# Patient Record
Sex: MALE | Race: WHITE | NOT HISPANIC OR LATINO | Employment: OTHER | URBAN - METROPOLITAN AREA
[De-identification: names, ages, dates, MRNs, and addresses within clinical notes are randomized per-mention and may not be internally consistent; named-entity substitution may affect disease eponyms.]

---

## 2023-05-05 DIAGNOSIS — R73.01 IMPAIRED FASTING GLUCOSE: ICD-10-CM

## 2023-05-05 DIAGNOSIS — I10 HYPERTENSION, UNSPECIFIED TYPE: ICD-10-CM

## 2023-05-05 DIAGNOSIS — R79.89 ELEVATED BRAIN NATRIURETIC PEPTIDE (BNP) LEVEL: ICD-10-CM

## 2023-05-05 DIAGNOSIS — E78.5 HYPERLIPIDEMIA, UNSPECIFIED HYPERLIPIDEMIA TYPE: ICD-10-CM

## 2023-05-05 PROBLEM — I48.91 AFIB (MULTI): Status: ACTIVE | Noted: 2023-05-05

## 2023-05-05 PROBLEM — R91.1 LUNG NODULE: Status: ACTIVE | Noted: 2023-05-05

## 2023-05-05 PROBLEM — R06.3 CHEYNE-STOKES RESPIRATION: Status: ACTIVE | Noted: 2023-05-05

## 2023-05-05 PROBLEM — R80.9 PROTEINURIA, UNSPECIFIED: Status: ACTIVE | Noted: 2023-05-05

## 2023-05-05 PROBLEM — R26.0 ATAXIC GAIT: Status: ACTIVE | Noted: 2023-05-05

## 2023-05-05 PROBLEM — R74.8 ELEVATED SERUM GAMMA-GLUTAMYL TRANSFERASE LEVEL: Status: ACTIVE | Noted: 2023-05-05

## 2023-05-05 PROBLEM — G47.39 TREATMENT-EMERGENT CENTRAL SLEEP APNEA: Status: ACTIVE | Noted: 2023-05-05

## 2023-05-05 PROBLEM — R35.1 NOCTURIA: Status: ACTIVE | Noted: 2023-05-05

## 2023-05-05 PROBLEM — J30.2 SEASONAL ALLERGIES: Status: ACTIVE | Noted: 2023-05-05

## 2023-05-05 PROBLEM — I71.20 THORACIC AORTIC ANEURYSM (CMS-HCC): Status: ACTIVE | Noted: 2023-05-05

## 2023-05-05 PROBLEM — H61.20 CERUMEN IMPACTION: Status: ACTIVE | Noted: 2023-05-05

## 2023-05-05 PROBLEM — G47.33 OBSTRUCTIVE SLEEP APNEA: Status: ACTIVE | Noted: 2023-05-05

## 2023-05-05 PROBLEM — E66.9 OBESITY (BMI 30-39.9): Status: ACTIVE | Noted: 2023-05-05

## 2023-05-05 NOTE — PROGRESS NOTES
Orders Placed This Encounter   Procedures    Comprehensive metabolic panel     Standing Status:   Future     Standing Expiration Date:   5/5/2024     Order Specific Question:   Release result to MyChart     Answer:   Immediate    CBC and Auto Differential     Standing Status:   Future     Standing Expiration Date:   5/5/2024     Order Specific Question:   Release result to MyChart     Answer:   Immediate    Lipid panel     Standing Status:   Future     Standing Expiration Date:   5/5/2024     Order Specific Question:   Release result to MyChart     Answer:   Immediate    CK     Standing Status:   Future     Standing Expiration Date:   5/5/2024     Order Specific Question:   Release result to MyChart     Answer:   Immediate    Hemoglobin A1c     Standing Status:   Future     Standing Expiration Date:   5/5/2024     Order Specific Question:   Release result to MyChart     Answer:   Immediate    Tsh With Reflex To Free T4 If Abnormal     Standing Status:   Future     Standing Expiration Date:   5/5/2024     Order Specific Question:   Release result to MyChart     Answer:   Immediate

## 2023-05-15 DIAGNOSIS — R79.89 OTHER SPECIFIED ABNORMAL FINDINGS OF BLOOD CHEMISTRY: ICD-10-CM

## 2023-05-15 DIAGNOSIS — I10 ESSENTIAL (PRIMARY) HYPERTENSION: ICD-10-CM

## 2023-05-15 RX ORDER — LOSARTAN POTASSIUM 100 MG/1
TABLET ORAL
Qty: 90 TABLET | Refills: 1 | Status: SHIPPED | OUTPATIENT
Start: 2023-05-15 | End: 2023-11-20

## 2023-05-15 RX ORDER — AMLODIPINE BESYLATE 5 MG/1
TABLET ORAL
Qty: 90 TABLET | Refills: 1 | Status: SHIPPED | OUTPATIENT
Start: 2023-05-15 | End: 2023-11-20

## 2023-05-26 ENCOUNTER — OFFICE VISIT (OUTPATIENT)
Dept: PRIMARY CARE | Facility: CLINIC | Age: 88
End: 2023-05-26
Payer: MEDICARE

## 2023-05-26 VITALS
HEART RATE: 86 BPM | WEIGHT: 254 LBS | DIASTOLIC BLOOD PRESSURE: 74 MMHG | HEIGHT: 72 IN | BODY MASS INDEX: 34.4 KG/M2 | SYSTOLIC BLOOD PRESSURE: 130 MMHG

## 2023-05-26 DIAGNOSIS — Z00.00 ROUTINE GENERAL MEDICAL EXAMINATION AT HEALTH CARE FACILITY: Primary | ICD-10-CM

## 2023-05-26 DIAGNOSIS — I48.11 LONGSTANDING PERSISTENT ATRIAL FIBRILLATION (MULTI): ICD-10-CM

## 2023-05-26 DIAGNOSIS — R35.1 NOCTURIA: ICD-10-CM

## 2023-05-26 DIAGNOSIS — I71.20 THORACIC AORTIC ANEURYSM WITHOUT RUPTURE, UNSPECIFIED PART (CMS-HCC): ICD-10-CM

## 2023-05-26 DIAGNOSIS — R73.01 IMPAIRED FASTING GLUCOSE: ICD-10-CM

## 2023-05-26 DIAGNOSIS — Z12.11 ENCOUNTER FOR SCREENING FOR MALIGNANT NEOPLASM OF COLON: ICD-10-CM

## 2023-05-26 DIAGNOSIS — E78.00 PURE HYPERCHOLESTEROLEMIA: ICD-10-CM

## 2023-05-26 DIAGNOSIS — I10 PRIMARY HYPERTENSION: ICD-10-CM

## 2023-05-26 PROCEDURE — 3078F DIAST BP <80 MM HG: CPT | Performed by: INTERNAL MEDICINE

## 2023-05-26 PROCEDURE — 1160F RVW MEDS BY RX/DR IN RCRD: CPT | Performed by: INTERNAL MEDICINE

## 2023-05-26 PROCEDURE — 1170F FXNL STATUS ASSESSED: CPT | Performed by: INTERNAL MEDICINE

## 2023-05-26 PROCEDURE — 1036F TOBACCO NON-USER: CPT | Performed by: INTERNAL MEDICINE

## 2023-05-26 PROCEDURE — G0439 PPPS, SUBSEQ VISIT: HCPCS | Performed by: INTERNAL MEDICINE

## 2023-05-26 PROCEDURE — 99214 OFFICE O/P EST MOD 30 MIN: CPT | Performed by: INTERNAL MEDICINE

## 2023-05-26 PROCEDURE — 1159F MED LIST DOCD IN RCRD: CPT | Performed by: INTERNAL MEDICINE

## 2023-05-26 PROCEDURE — 3075F SYST BP GE 130 - 139MM HG: CPT | Performed by: INTERNAL MEDICINE

## 2023-05-26 RX ORDER — ACETAMINOPHEN 500 MG
TABLET ORAL
COMMUNITY

## 2023-05-26 RX ORDER — ATORVASTATIN CALCIUM 10 MG/1
1 TABLET, FILM COATED ORAL NIGHTLY
COMMUNITY
Start: 2008-05-01

## 2023-05-26 RX ORDER — HYDROCHLOROTHIAZIDE 25 MG/1
25 TABLET ORAL DAILY
COMMUNITY
End: 2023-11-20

## 2023-05-26 RX ORDER — RIVAROXABAN 20 MG/1
1 TABLET, FILM COATED ORAL DAILY
COMMUNITY
Start: 2017-09-12

## 2023-05-26 RX ORDER — OMEGA-3S/DHA/EPA/FISH OIL 300-1000MG
CAPSULE ORAL
COMMUNITY
End: 2023-05-26 | Stop reason: ALTCHOICE

## 2023-05-26 ASSESSMENT — ACTIVITIES OF DAILY LIVING (ADL)
TAKING_MEDICATION: INDEPENDENT
DOING_HOUSEWORK: INDEPENDENT
BATHING: INDEPENDENT
GROCERY_SHOPPING: INDEPENDENT
MANAGING_FINANCES: INDEPENDENT
DRESSING: INDEPENDENT

## 2023-05-26 NOTE — PATIENT INSTRUCTIONS
Problem List Items Addressed This Visit          Circulatory    Afib (CMS/HCC)     Rate controlled, on xarelto, followed by Dr. Chowdhury         Hypertension     Well controlled on current medication, no change         Relevant Orders    CT cardiac scoring wo IV contrast    Thoracic aortic aneurysm (CMS/HCC)     Checking ct angio , last done in 2021, call [phone] to schedule         Relevant Orders    CT angio chest w and wo IV contrast       Endocrine/Metabolic    Impaired fasting glucose     Has been well controlled, limit sugars and carbs and get regular exercise         Relevant Orders    Hemoglobin A1C    CT cardiac scoring wo IV contrast       Other    Hyperlipidemia     Get fasting labs         Relevant Orders    CBC and Auto Differential    Comprehensive Metabolic Panel    Creatine Kinase    Lipid Panel    TSH with reflex to Free T4 if abnormal    CT cardiac scoring wo IV contrast    Nocturia    Relevant Orders    Prostate Specific Antigen     Other Visit Diagnoses       Routine general medical examination at health care facility    -  Primary    Encounter for screening for malignant neoplasm of colon        Relevant Orders    Fecal Occult Blood Immunoassay           Call [phone] to get ct angio for aneurysm follow up now and do ct cardiac score in 6months  Immunizations are up to date  Do stool test for colon screening  If labs good, recheck in 1 year, if cholesterol up OR sugar up will recheck in 6months

## 2023-05-26 NOTE — PROGRESS NOTES
Subjective   Reason for Visit: Rayo Padgett is an 87 y.o. male here for a Medicare Wellness visit.          Reviewed all medications by prescribing practitioner or clinical pharmacist (such as prescriptions, OTCs, herbal therapies and supplements) and documented in the medical record.    He has been very stressed about his wifes memory but feeling good since then  No sob  He walks the dog 3 to 5 holes [place] a golf [place],   No cp or pressure  Bowels are regular, no blood or black stools  Urine flow is good, nocturia 1-2/night, stream is strong    Home bp has been 136/78  No leg swelling.         Patient Care Team:  Debbie La DO as PCP - General  Debbie La DO as PCP - [facility] Attributed Provider     Review of Systems    Objective   Vitals:  /74   Pulse 86   Ht 1.829 m (6')   Wt 115 kg (254 lb)   BMI 34.45 kg/m²       Physical Exam  Constitutional:       Appearance: Normal appearance.   Neck:      Vascular: No carotid bruit.   Cardiovascular:      Rate and Rhythm: Normal rate. Rhythm irregular.      Heart sounds: No murmur heard.  Pulmonary:      Effort: Pulmonary effort is normal.      Breath sounds: Normal breath sounds.   Chest:   Breasts:     Left: Mass (linear mass in the left upper inner quadrant (implanted recorder)) present.   Musculoskeletal:      Right lower leg: No edema.      Left lower leg: No edema.      Comments: Decrease ext rotation of hips b/l   Lymphadenopathy:      Cervical: No cervical adenopathy.   Neurological:      Mental Status: He is oriented to person, place, and time.   Psychiatric:         Mood and Affect: Mood normal.         Assessment/Plan   Problem List Items Addressed This Visit    None    Patient Instructions     Problem List Items Addressed This Visit          Circulatory    Afib (CMS/HCC)     Rate controlled, on xarelto, followed by Dr. Chowdhury         Hypertension     Well controlled on current medication, no change         Relevant Orders    CT cardiac  scoring wo IV contrast    Thoracic aortic aneurysm (CMS/HCC)     Checking ct angio , last done in 2021, call [phone] to schedule         Relevant Orders    CT angio chest w and wo IV contrast       Endocrine/Metabolic    Impaired fasting glucose     Has been well controlled, limit sugars and carbs and get regular exercise         Relevant Orders    Hemoglobin A1C    CT cardiac scoring wo IV contrast       Other    Hyperlipidemia     Get fasting labs         Relevant Orders    CBC and Auto Differential    Comprehensive Metabolic Panel    Creatine Kinase    Lipid Panel    TSH with reflex to Free T4 if abnormal    CT cardiac scoring wo IV contrast    Nocturia    Relevant Orders    Prostate Specific Antigen     Other Visit Diagnoses       Routine general medical examination at health care facility    -  Primary    Encounter for screening for malignant neoplasm of colon        Relevant Orders    Fecal Occult Blood Immunoassay           Call [phone] to get ct angio for aneurysm follow up now and do ct cardiac score in 6months  Immunizations are up to date  Do stool test for colon screening  If labs good, recheck in 1 year, if cholesterol up OR sugar up will recheck in 6months

## 2023-05-26 NOTE — PROGRESS NOTES
Subjective   Patient ID: Rayo Padgett is a 87 y.o. male who presents for Medicare Annual Wellness Visit Subsequent.    HPI     Review of Systems    Objective   Ht 1.829 m (6')   Wt 115 kg (254 lb)   BMI 34.45 kg/m²     Physical Exam    Assessment/Plan

## 2023-06-14 ENCOUNTER — LAB (OUTPATIENT)
Dept: LAB | Facility: LAB | Age: 88
End: 2023-06-14
Payer: MEDICARE

## 2023-06-14 DIAGNOSIS — R35.1 NOCTURIA: ICD-10-CM

## 2023-06-14 DIAGNOSIS — R73.01 IMPAIRED FASTING GLUCOSE: ICD-10-CM

## 2023-06-14 DIAGNOSIS — E78.00 PURE HYPERCHOLESTEROLEMIA: ICD-10-CM

## 2023-06-14 LAB
ALANINE AMINOTRANSFERASE (SGPT) (U/L) IN SER/PLAS: 19 U/L (ref 10–52)
ALBUMIN (G/DL) IN SER/PLAS: 4.2 G/DL (ref 3.4–5)
ALKALINE PHOSPHATASE (U/L) IN SER/PLAS: 73 U/L (ref 33–136)
ANION GAP IN SER/PLAS: 15 MMOL/L (ref 10–20)
ASPARTATE AMINOTRANSFERASE (SGOT) (U/L) IN SER/PLAS: 16 U/L (ref 9–39)
BASOPHILS (10*3/UL) IN BLOOD BY AUTOMATED COUNT: 0.01 X10E9/L (ref 0–0.1)
BASOPHILS/100 LEUKOCYTES IN BLOOD BY AUTOMATED COUNT: 0.2 % (ref 0–2)
BILIRUBIN TOTAL (MG/DL) IN SER/PLAS: 1.2 MG/DL (ref 0–1.2)
CALCIUM (MG/DL) IN SER/PLAS: 9.1 MG/DL (ref 8.6–10.3)
CARBON DIOXIDE, TOTAL (MMOL/L) IN SER/PLAS: 29 MMOL/L (ref 21–32)
CHLORIDE (MMOL/L) IN SER/PLAS: 100 MMOL/L (ref 98–107)
CHOLESTEROL (MG/DL) IN SER/PLAS: 118 MG/DL (ref 0–199)
CHOLESTEROL IN HDL (MG/DL) IN SER/PLAS: 54.1 MG/DL
CHOLESTEROL/HDL RATIO: 2.2
CREATINE KINASE (U/L) IN SER/PLAS: 97 U/L (ref 0–325)
CREATININE (MG/DL) IN SER/PLAS: 0.99 MG/DL (ref 0.5–1.3)
EOSINOPHILS (10*3/UL) IN BLOOD BY AUTOMATED COUNT: 0.14 X10E9/L (ref 0–0.4)
EOSINOPHILS/100 LEUKOCYTES IN BLOOD BY AUTOMATED COUNT: 2.8 % (ref 0–6)
ERYTHROCYTE DISTRIBUTION WIDTH (RATIO) BY AUTOMATED COUNT: 13.6 % (ref 11.5–14.5)
ERYTHROCYTE MEAN CORPUSCULAR HEMOGLOBIN CONCENTRATION (G/DL) BY AUTOMATED: 32.8 G/DL (ref 32–36)
ERYTHROCYTE MEAN CORPUSCULAR VOLUME (FL) BY AUTOMATED COUNT: 94 FL (ref 80–100)
ERYTHROCYTES (10*6/UL) IN BLOOD BY AUTOMATED COUNT: 5.01 X10E12/L (ref 4.5–5.9)
GFR MALE: 74 ML/MIN/1.73M2
GLUCOSE (MG/DL) IN SER/PLAS: 93 MG/DL (ref 74–99)
HEMATOCRIT (%) IN BLOOD BY AUTOMATED COUNT: 47 % (ref 41–52)
HEMOGLOBIN (G/DL) IN BLOOD: 15.4 G/DL (ref 13.5–17.5)
IMMATURE GRANULOCYTES/100 LEUKOCYTES IN BLOOD BY AUTOMATED COUNT: 0.4 % (ref 0–0.9)
LDL: 51 MG/DL (ref 0–99)
LEUKOCYTES (10*3/UL) IN BLOOD BY AUTOMATED COUNT: 4.9 X10E9/L (ref 4.4–11.3)
LYMPHOCYTES (10*3/UL) IN BLOOD BY AUTOMATED COUNT: 1.05 X10E9/L (ref 0.8–3)
LYMPHOCYTES/100 LEUKOCYTES IN BLOOD BY AUTOMATED COUNT: 21.3 % (ref 13–44)
MONOCYTES (10*3/UL) IN BLOOD BY AUTOMATED COUNT: 0.6 X10E9/L (ref 0.05–0.8)
MONOCYTES/100 LEUKOCYTES IN BLOOD BY AUTOMATED COUNT: 12.2 % (ref 2–10)
NEUTROPHILS (10*3/UL) IN BLOOD BY AUTOMATED COUNT: 3.11 X10E9/L (ref 1.6–5.5)
NEUTROPHILS/100 LEUKOCYTES IN BLOOD BY AUTOMATED COUNT: 63.1 % (ref 40–80)
PLATELETS (10*3/UL) IN BLOOD AUTOMATED COUNT: 216 X10E9/L (ref 150–450)
POTASSIUM (MMOL/L) IN SER/PLAS: 4 MMOL/L (ref 3.5–5.3)
PROSTATE SPECIFIC AG (NG/ML) IN SER/PLAS: 0.63 NG/ML (ref 0–4)
PROTEIN TOTAL: 6.3 G/DL (ref 6.4–8.2)
SODIUM (MMOL/L) IN SER/PLAS: 140 MMOL/L (ref 136–145)
THYROTROPIN (MIU/L) IN SER/PLAS BY DETECTION LIMIT <= 0.05 MIU/L: 1.69 MIU/L (ref 0.44–3.98)
TRIGLYCERIDE (MG/DL) IN SER/PLAS: 65 MG/DL (ref 0–149)
UREA NITROGEN (MG/DL) IN SER/PLAS: 15 MG/DL (ref 6–23)
VLDL: 13 MG/DL (ref 0–40)

## 2023-06-14 PROCEDURE — 80061 LIPID PANEL: CPT

## 2023-06-14 PROCEDURE — 84153 ASSAY OF PSA TOTAL: CPT

## 2023-06-14 PROCEDURE — 84443 ASSAY THYROID STIM HORMONE: CPT

## 2023-06-14 PROCEDURE — 80053 COMPREHEN METABOLIC PANEL: CPT

## 2023-06-14 PROCEDURE — 83036 HEMOGLOBIN GLYCOSYLATED A1C: CPT

## 2023-06-14 PROCEDURE — 82550 ASSAY OF CK (CPK): CPT

## 2023-06-14 PROCEDURE — 85025 COMPLETE CBC W/AUTO DIFF WBC: CPT

## 2023-06-14 PROCEDURE — 36415 COLL VENOUS BLD VENIPUNCTURE: CPT

## 2023-06-15 LAB
ESTIMATED AVERAGE GLUCOSE FOR HBA1C: 120 MG/DL
HEMOGLOBIN A1C/HEMOGLOBIN TOTAL IN BLOOD: 5.8 %

## 2023-06-25 PROCEDURE — 82274 ASSAY TEST FOR BLOOD FECAL: CPT

## 2023-06-30 LAB — FECAL OCCULT BLD IMMUNOASSAY: NEGATIVE

## 2023-11-15 DIAGNOSIS — R73.01 IMPAIRED FASTING GLUCOSE: Primary | ICD-10-CM

## 2023-11-15 DIAGNOSIS — E78.00 PURE HYPERCHOLESTEROLEMIA: ICD-10-CM

## 2023-11-19 DIAGNOSIS — I10 ESSENTIAL (PRIMARY) HYPERTENSION: ICD-10-CM

## 2023-11-19 DIAGNOSIS — R79.89 OTHER SPECIFIED ABNORMAL FINDINGS OF BLOOD CHEMISTRY: ICD-10-CM

## 2023-11-20 RX ORDER — LOSARTAN POTASSIUM 100 MG/1
TABLET ORAL
Qty: 90 TABLET | Refills: 1 | Status: SHIPPED | OUTPATIENT
Start: 2023-11-20 | End: 2024-05-07

## 2023-11-20 RX ORDER — AMLODIPINE BESYLATE 5 MG/1
TABLET ORAL
Qty: 90 TABLET | Refills: 1 | Status: SHIPPED | OUTPATIENT
Start: 2023-11-20 | End: 2024-05-07

## 2023-11-20 RX ORDER — HYDROCHLOROTHIAZIDE 25 MG/1
25 TABLET ORAL DAILY
Qty: 90 TABLET | Refills: 1 | Status: SHIPPED | OUTPATIENT
Start: 2023-11-20 | End: 2024-05-07

## 2024-02-01 ENCOUNTER — HOSPITAL ENCOUNTER (OUTPATIENT)
Dept: RADIOLOGY | Facility: HOSPITAL | Age: 89
Discharge: HOME | End: 2024-02-01
Payer: MEDICARE

## 2024-02-01 DIAGNOSIS — E78.00 PURE HYPERCHOLESTEROLEMIA: ICD-10-CM

## 2024-02-01 DIAGNOSIS — R73.01 IMPAIRED FASTING GLUCOSE: ICD-10-CM

## 2024-02-01 PROCEDURE — 75571 CT HRT W/O DYE W/CA TEST: CPT

## 2024-04-10 ENCOUNTER — OFFICE VISIT (OUTPATIENT)
Dept: SLEEP MEDICINE | Facility: CLINIC | Age: 89
End: 2024-04-10
Payer: MEDICARE

## 2024-04-10 VITALS
WEIGHT: 251 LBS | HEIGHT: 72 IN | HEART RATE: 82 BPM | SYSTOLIC BLOOD PRESSURE: 134 MMHG | DIASTOLIC BLOOD PRESSURE: 90 MMHG | RESPIRATION RATE: 18 BRPM | BODY MASS INDEX: 34 KG/M2 | OXYGEN SATURATION: 97 %

## 2024-04-10 DIAGNOSIS — G47.39 TREATMENT-EMERGENT CENTRAL SLEEP APNEA: Primary | ICD-10-CM

## 2024-04-10 DIAGNOSIS — G47.33 OBSTRUCTIVE SLEEP APNEA: ICD-10-CM

## 2024-04-10 DIAGNOSIS — R06.3 CHEYNE-STOKES RESPIRATION: ICD-10-CM

## 2024-04-10 PROCEDURE — 3080F DIAST BP >= 90 MM HG: CPT | Performed by: INTERNAL MEDICINE

## 2024-04-10 PROCEDURE — 1126F AMNT PAIN NOTED NONE PRSNT: CPT | Performed by: INTERNAL MEDICINE

## 2024-04-10 PROCEDURE — 1157F ADVNC CARE PLAN IN RCRD: CPT | Performed by: INTERNAL MEDICINE

## 2024-04-10 PROCEDURE — 3075F SYST BP GE 130 - 139MM HG: CPT | Performed by: INTERNAL MEDICINE

## 2024-04-10 PROCEDURE — 99214 OFFICE O/P EST MOD 30 MIN: CPT | Performed by: INTERNAL MEDICINE

## 2024-04-10 PROCEDURE — 1160F RVW MEDS BY RX/DR IN RCRD: CPT | Performed by: INTERNAL MEDICINE

## 2024-04-10 PROCEDURE — G2211 COMPLEX E/M VISIT ADD ON: HCPCS | Performed by: INTERNAL MEDICINE

## 2024-04-10 PROCEDURE — 1159F MED LIST DOCD IN RCRD: CPT | Performed by: INTERNAL MEDICINE

## 2024-04-10 ASSESSMENT — PATIENT HEALTH QUESTIONNAIRE - PHQ9
2. FEELING DOWN, DEPRESSED OR HOPELESS: NOT AT ALL
1. LITTLE INTEREST OR PLEASURE IN DOING THINGS: NOT AT ALL
SUM OF ALL RESPONSES TO PHQ9 QUESTIONS 1 AND 2: 0

## 2024-04-10 ASSESSMENT — SLEEP AND FATIGUE QUESTIONNAIRES
HOW LIKELY ARE YOU TO NOD OFF OR FALL ASLEEP WHILE WATCHING TV: SLIGHT CHANCE OF DOZING
HOW LIKELY ARE YOU TO NOD OFF OR FALL ASLEEP WHILE SITTING QUIETLY AFTER LUNCH WITHOUT ALCOHOL: WOULD NEVER DOZE
HOW LIKELY ARE YOU TO NOD OFF OR FALL ASLEEP IN A CAR, WHILE STOPPED FOR A FEW MINUTES IN TRAFFIC: WOULD NEVER DOZE
HOW LIKELY ARE YOU TO NOD OFF OR FALL ASLEEP WHILE LYING DOWN TO REST IN THE AFTERNOON WHEN CIRCUMSTANCES PERMIT: MODERATE CHANCE OF DOZING
HOW LIKELY ARE YOU TO NOD OFF OR FALL ASLEEP WHILE SITTING AND TALKING TO SOMEONE: WOULD NEVER DOZE
SITING INACTIVE IN A PUBLIC PLACE LIKE A CLASS ROOM OR A MOVIE THEATER: WOULD NEVER DOZE
ESS-CHAD TOTAL SCORE: 4
HOW LIKELY ARE YOU TO NOD OFF OR FALL ASLEEP WHEN YOU ARE A PASSENGER IN A CAR FOR AN HOUR WITHOUT A BREAK: WOULD NEVER DOZE
HOW LIKELY ARE YOU TO NOD OFF OR FALL ASLEEP WHILE SITTING AND READING: SLIGHT CHANCE OF DOZING

## 2024-04-10 ASSESSMENT — PAIN SCALES - GENERAL: PAINLEVEL: 0-NO PAIN

## 2024-04-12 ENCOUNTER — APPOINTMENT (OUTPATIENT)
Dept: SLEEP MEDICINE | Facility: CLINIC | Age: 89
End: 2024-04-12
Payer: MEDICARE

## 2024-04-25 NOTE — PATIENT INSTRUCTIONS
"Thank you for coming to the Sleep Medicine Clinic today! Your sleep medicine provider today was: Yamileth De Dios MD Below is a summary of your treatment plan, patient education, other important information, and our contact numbers.      TREATMENT PLAN     - Continue current machine settings. Continue using machine every night all night. Order placed to renew PAP supplies.   - Please read the \"Patient Education\" section below for more detailed information. Try implementing tips, reminders, strategies, and supportive management.   - If not yet done, please sign up for Livescribe to make a future schedule, send prescription requests, or send messages.    Follow-up Appointment:   Follow-up in 1 year.    PATIENT EDUCATION     Obstructive Sleep Apnea (DELANEY) is a sleep disorder where your upper airway muscles relax during sleep and the airway intermittently and repetitively narrows and collapses leading to partially blocked airway (hypopnea) or completely blocked airway (apnea) which, in turn, can disrupt breathing in sleep, lower oxygen levels while you sleep and cause night time wakings. Because both apnea and hypopnea may cause higher carbon dioxide or low oxygen levels, untreated DELANEY can lead to heart arrhythmia, elevation of blood pressure, and make it harder for the body to consolidate memory and facilitate metabolism (leading to higher blood sugars at night). Frequent partial arousals occur during sleep resulting in sleep deprivation and daytime sleepiness. DELANEY is associated with an increased risk of cardiovascular disease, stroke, hypertension, and insulin resistance. Moreover, untreated DELANEY with excessive daytime sleepiness can increase the risk of motor vehicular accidents.    Some conservative strategies for DELANEY regardless of DELANEY severity are:   Positional therapy - Avoid sleeping on your back.   Healthy diet and regular exercise to optimize weight is highly encouraged.   Avoid alcohol late in the evening and " sedative-hypnotics as these substances can make sleep apnea worse.   Improve breathing through the nose with intranasal steroid spray, saline rinse, or antihistamines    Safety: Avoid driving vehicle and operating heavy equipment while sleepy. Drowsy driving may lead to life-threatening motor vehicle accidents. A person driving while sleepy is 5 times more likely to have an accident. If you feel sleepy, pull over and take a short power nap (sleep for less than 30 minutes). Otherwise, ask somebody to drive you.    Treatment options for sleep apnea include weight management, positional therapy, Positive Airway Therapy (PAP) therapy, oral appliance therapy, hypoglossal nerve stimulator (Inspire) and select airway surgeries.    Annual Reminders About Your Sleep Apnea    Your sleep apnea is well controlled based on reviewing your PAP Data Report.     General Reminders:  Continue current machine settings. Continue using machine every night. Need at least 4 hours daily usage.   Remember to clean your mask, tubings, and water chamber regularly as instructed.   Know the replacement schedule of your supplies/ accessories and contact your DME (durable medical equipment) provider if you are due for them.   Avoid driving or operating heavy machinery when drowsy. A person driving while sleepy is 5 times more likely to have an accident. If you feel sleepy, pull over and take a short power nap (sleep for less than 30 minutes). Otherwise, ask somebody to drive you.    [facility] or calling our office if you have any of the following symptoms:  Snoring or stopping breathing while using the machine  Recurrence of fatigue, sleepiness, insomnia, and other symptoms you had prior using machine  Persistent or worsening fatigue or sleepiness despite regular use of machine  Issues tolerating the machine like bloating sensation, air hunger, too much hot air, too much pressure, taking off mask without recall in the middle  of sleep, etc.     Other conservative measures to improve sleep apnea:  Losing weight can lead to some improvement of DELANEY which means you will need lower pressures in machine to control your DELANEY. In some patients, they don't need to use the machine after bariatric surgery. Hence, consider medical and/or surgical weight loss especially if your BMI is more than 35.  Avoiding alcohol, sedative-hypnotics, and sedating medications is imperative as these substances can worsen snoring and sleep apnea  If you have nasal congestion or seasonal allergies, improving your breathing through the nose is critical for treating sleep apnea, tolerating CPAP, and improving your sleep; hence, using intranasal steroid spray like Flonase might help. Make sure you know the proper way to use it.  Stay off your back when sleeping.    Common issues with PAP machine:  Mask gets dislodged when turning to the side: Consider getting a CPAP pillow or switching to a mask with hose on top.     Dry mouth:  Your machine has built-in humidifier that heats up the air to prevent dry mouth. It can be adjusted to your comfort. You can try that first and increase setting one level one night at a time to check which setting is comfortable and effective in lessening dry mouth. In some patients with heated hose, adjusting tube temperature to make air warmer can improve dry mouth. If dry mouth persists despite adjusting humidity or tube temperature setting, may apply OTC Biotene gel over the gums at bedtime.  If Biotene gel is not effective, consider trying XEROSTOM gel from [employer].  Also, eliminate or reduce dose of medications that can cause dry mouth if possible. Lastly, may try getting a separate room humidifier machine.    Airleaks: Please call DME as they may need to adjust your mask or refit you with a different kind or different size of mask. In addition, you can ask DME for tips on getting a good mask seal and mask fit.     Difficulty tolerating  "the mask: Contact your DME to try a different kind of mask and/or call office to get a referral to Sleep Psychologist for CPAP desensitization. CPAP desensitization technique is a set of strategies that helps patient cope with claustrophobia and anxiety related to wearing mask. Alternatively, we can do a daytime mini-sleep study called PAP-nap trial wherein you will try on different kinds of mask and the sleep technician will try different pressure settings on CPAP and BPAP machines to see which specific pressure is tolerable and comfortable for you.     Water droplets or moisture within the hose and/or mask: This is called rain-out and it is caused by condensation of too much heated humidity on the cooler walls of the hose. If you have rain-out, turn down humidity settings or get a heated hose. If you already have a heated hose, turn up the \"tube temperature\" of the heated hose. Alternatively, if you don't want to get a heated hose or warmer air, may wrap the CPAP hose with stockings to keep it somewhat warm. Also, you need to place the machine on the floor and lower the hose so that water won't travel upward towards your mask.     Maintaining your CPAP/BPAP device:    The humidification chamber (aka water tank or water chamber) needs to be filled with distilled water to prevent buildup of white deposits in the future. If you cannot find distilled water, you can use tap water but expect to have white deposits buildup seen after prolonged use with tap water. If you start seeing white deposits on the water chamber, you can clean it by filling it with equal parts of distilled white vinegar and water. Let the vinegar-water mixture sit for 2 hours, and then rinse it with running tap water. Clean with soap and water then let it dry.     You should try to keep your machine clean in order to work well. Here are some tips to clean PAP supplies / accessories:    Clean the humidification chamber (aka water tank) as well as " your mask and tubing at least once a week with soap and water.   Alternatively, you can fill a sink or basin with warm water and add a little mild detergent, like Ivory dish soap. Gently wipe your supplies with the soapy water to free all the oils and dirt that may have collected. Once that's done, rinse these items with clean water until the soap is gone and let them air dry. You can hang your tubing over the curtain mary in your bathroom so that it dries.  The mask insert (part of the mask that has contact with your skin) needs to be cleaned with soap and water daily. Another option is to wipe them down with CPAP wipes or baby wipes.    You should replace your mask and tubing frequently in order to prevent bacteria buildup, machine damage, and mask seal issues. The older the mask and hose, the high likelihood that there is bacteria buildup in it especially if they are not cleaned regularly. Dirty filters damage machines because build-up of dust and contaminants can cause machine to over-heat, and in time, damage the motor of machine. Cushions lose their seal over time as most masks are made of plastic and silicone while headgear is made of neoprene. These materials will break down with age and frequent use. Here is the recommended replacement schedule for PAP supplies / accessories:    Twice a month- disposable filters and cushions for nasal mask or nasal pillows.  Once a month- cushion for full face mask  Every 3 months- mask with headgear and PAP tubing (standard or heated hose)  Every 6 months- reusable filter, water chamber, and chin strap     Other useful information:    Some people do not put water in the tank while other people prefers to put water in the tank to prevent mouth dryness. Try to experiment to determine which is more comfortable for you.   In general, new machines have 2 years warranty on parts while health insurance allows you to have a new machine once every 5 years.     You can also go to the  following EDUCATION WEBSITES for further information:   American Academy of Sleep Medicine http://sleepeducation.org  National Sleep Foundation: https://sleepfoundation.org  American Sleep Apnea Association: https://www.sleepapnea.org (for patients with sleep apnea)  Narcolepsy Network: https://www.narcolepsynetwork.org (for patients with narcolepsy)  [employer]: https://www.Skyscannercolepsy.org (for patients with narcolepsy)  Hypersomnia Foundation: https://www.hypersomniafoundation.org (for patients with idiopathic hypersomnia)  RLS foundation: https://www.rls.org (for patients with restless leg syndrome)    IMPORTANT INFORMATION     Call 911 for medical emergencies.  Our offices are generally open from Monday-Friday, 8 am - 5 pm.   There are no supporting services by either the sleep doctors or their staff on weekends and Holidays, or after 5 PM on weekdays.   If you need to get in touch with me, you may either call my office number or you can use BUKA.  If a referral for a test, for CPAP, or for another specialist was made, and you have not heard about scheduling this within a week, please call scheduling at [phone]).  If you are unable to make your appointment for clinic or an overnight study, kindly call the office or sleep testing center at least 48 hours in advance to cancel and reschedule.  If you are on CPAP, please bring your device's card and/or the device to each clinic appointment.   In case of problems with PAP machine or mask interface, please contact your "[employer] (Durable Medical Equipment) company first. "[employer] is the company who provides you the machine and/or PAP supplies.       PRESCRIPTIONS     We require 7 days advanced notice for prescription refills. If we do not receive the request in this time, we cannot guarantee that your medication will be refilled in time.    IMPORTANT PHONE NUMBERS     Sleep Medicine Clinic Fax: [phone]  Appointments (for Pediatric Sleep Clinic):  [phone] - option 1  Appointments (for Adult Sleep Clinic): [phone] - option 2  Appointments (For Sleep Studies): [phone] - option 3  Behavioral Sleep Medicine: [phone]  Sleep Surgery: [phone]  Nutrition Service: [phone]  Weight management clinics with endocrinology: [phone]  Bariatric Services: [phone] (includes weight loss medications and weight loss surgery)  [facility]: [phone] (offers holistic approaches to weight management)  ENT (Otolaryngology): [phone]  Headache Clinic (Neurology): [phone]  Neurology: [phone]  Psychiatry: [phone]  Pulmonary Function Testing (PFT) Center: [phone]  Pulmonary Medicine: [phone]  [employer]): [phone]      OUR SLEEP TESTING LOCATIONS     Our team will contact you to schedule your sleep study, however, you can contact us as follow:  Main Phone Line (scheduling only): [phone]), option 3  Adult and Pediatric Locations  [facility] (6 years and older): [facility] - 4th floor ([address] [place] [address]) After hours line: [phone]  [facility] (Main campus: All ages): [facility], 6th floor. After hours line: [phone]   [place] (5 years and older; younger considered on case-by-case basis): [address]; [address], [address]. Scheduling  After hours line: [phone]   [place] (6 years and older): [address]; [address]; [address]   [place] (6 years and older): [address] [facility], [address]  After hours line: [phone]   [place] (13 years and older) in [place]: [address] [facility], 2nd floor  After hours line: [phone]   [place] (13 year and older): [address]  After hours line: [phone]     Adult Only Locations:   [place] (18 years and older): [address] [place] [address], 2nd floor   [place] (18  "years and older): [address] [facility]; 4th floor  After hours line: [phone]  [facility] (18 years and older) at [place]: [address] [facility]  After hours line: [phone]     CONTACTING YOUR SLEEP MEDICINE PROVIDER AND SLEEP TEAM      For issues with your machine or mask interface, please call your DME provider first. [employer] for durable medical company. DME is the company who provides you the machine and/or PAP supplies / accessories.   To schedule, cancel, or reschedule SLEEP STUDY APPOINTMENTS, please call the Main Phone Line at [phone] - option 3.   To schedule, cancel, or reschedule CLINIC APPOINTMENTS, you can do it in \"[facility]\", call [phone] to speak with my  (Julieth Rubin), or call the Main Phone Line at [phone] - option 2  For CLINICAL QUESTIONS or MEDICATION REFILLS, please call direct line for Adult Sleep Nurses at [phone].   Lastly, you can also send a message directly to your provider through \"My Chart\", which is the email service through your  Records Account: [email or web address]       Here at [facility], we wish you a restful sleep!    "

## 2024-05-06 DIAGNOSIS — I10 ESSENTIAL (PRIMARY) HYPERTENSION: ICD-10-CM

## 2024-05-06 DIAGNOSIS — R79.89 OTHER SPECIFIED ABNORMAL FINDINGS OF BLOOD CHEMISTRY: ICD-10-CM

## 2024-05-07 RX ORDER — LOSARTAN POTASSIUM 100 MG/1
TABLET ORAL
Qty: 90 TABLET | Refills: 1 | Status: SHIPPED | OUTPATIENT
Start: 2024-05-07

## 2024-05-07 RX ORDER — HYDROCHLOROTHIAZIDE 25 MG/1
25 TABLET ORAL DAILY
Qty: 90 TABLET | Refills: 1 | Status: SHIPPED | OUTPATIENT
Start: 2024-05-07 | End: 2024-05-29

## 2024-05-07 RX ORDER — AMLODIPINE BESYLATE 5 MG/1
TABLET ORAL
Qty: 90 TABLET | Refills: 1 | Status: SHIPPED | OUTPATIENT
Start: 2024-05-07

## 2024-05-07 NOTE — TELEPHONE ENCOUNTER
Requested Prescriptions     Pending Prescriptions Disp Refills    hydroCHLOROthiazide (HYDRODiuril) 25 mg tablet [Pharmacy Med Name: HYDROCHLOROTHIAZIDE 25 MG TAB] 90 tablet 1     Sig: TAKE 1 TABLET BY MOUTH EVERY DAY    amLODIPine (Norvasc) 5 mg tablet [Pharmacy Med Name: AMLODIPINE BESYLATE 5 MG TAB] 90 tablet 1     Sig: TAKE 1 TABLET BY MOUTH EVERY DAY    losartan (Cozaar) 100 mg tablet [Pharmacy Med Name: LOSARTAN POTASSIUM 100 MG TAB] 90 tablet 1     Sig: TAKE 1 TABLET BY MOUTH EVERY DAY

## 2024-05-15 DIAGNOSIS — E78.5 DYSLIPIDEMIA, GOAL LDL BELOW 70: ICD-10-CM

## 2024-05-15 DIAGNOSIS — R73.01 IMPAIRED FASTING GLUCOSE: Primary | ICD-10-CM

## 2024-05-15 DIAGNOSIS — R35.1 NOCTURIA: ICD-10-CM

## 2024-05-17 ENCOUNTER — LAB (OUTPATIENT)
Dept: LAB | Facility: LAB | Age: 89
End: 2024-05-17
Payer: MEDICARE

## 2024-05-17 DIAGNOSIS — R35.1 NOCTURIA: ICD-10-CM

## 2024-05-17 DIAGNOSIS — R73.01 IMPAIRED FASTING GLUCOSE: ICD-10-CM

## 2024-05-17 DIAGNOSIS — E78.5 DYSLIPIDEMIA, GOAL LDL BELOW 70: ICD-10-CM

## 2024-05-17 LAB
ALBUMIN SERPL BCP-MCNC: 4.2 G/DL (ref 3.4–5)
ALP SERPL-CCNC: 72 U/L (ref 33–136)
ALT SERPL W P-5'-P-CCNC: 16 U/L (ref 10–52)
ANION GAP SERPL CALC-SCNC: 14 MMOL/L (ref 10–20)
AST SERPL W P-5'-P-CCNC: 17 U/L (ref 9–39)
BASOPHILS # BLD AUTO: 0.03 X10*3/UL (ref 0–0.1)
BASOPHILS NFR BLD AUTO: 0.5 %
BILIRUB SERPL-MCNC: 1.3 MG/DL (ref 0–1.2)
BUN SERPL-MCNC: 26 MG/DL (ref 6–23)
CALCIUM SERPL-MCNC: 8.9 MG/DL (ref 8.6–10.3)
CHLORIDE SERPL-SCNC: 102 MMOL/L (ref 98–107)
CHOLEST SERPL-MCNC: 143 MG/DL (ref 0–199)
CHOLESTEROL/HDL RATIO: 2.7
CK SERPL-CCNC: 108 U/L (ref 0–325)
CO2 SERPL-SCNC: 29 MMOL/L (ref 21–32)
CREAT SERPL-MCNC: 1.23 MG/DL (ref 0.5–1.3)
EGFRCR SERPLBLD CKD-EPI 2021: 57 ML/MIN/1.73M*2
EOSINOPHIL # BLD AUTO: 0.25 X10*3/UL (ref 0–0.4)
EOSINOPHIL NFR BLD AUTO: 4.5 %
ERYTHROCYTE [DISTWIDTH] IN BLOOD BY AUTOMATED COUNT: 13.8 % (ref 11.5–14.5)
EST. AVERAGE GLUCOSE BLD GHB EST-MCNC: 108 MG/DL
GLUCOSE SERPL-MCNC: 103 MG/DL (ref 74–99)
HBA1C MFR BLD: 5.4 %
HCT VFR BLD AUTO: 48.1 % (ref 41–52)
HDLC SERPL-MCNC: 52.7 MG/DL
HGB BLD-MCNC: 15.8 G/DL (ref 13.5–17.5)
IMM GRANULOCYTES # BLD AUTO: 0.01 X10*3/UL (ref 0–0.5)
IMM GRANULOCYTES NFR BLD AUTO: 0.2 % (ref 0–0.9)
LDLC SERPL CALC-MCNC: 78 MG/DL
LYMPHOCYTES # BLD AUTO: 1.24 X10*3/UL (ref 0.8–3)
LYMPHOCYTES NFR BLD AUTO: 22.4 %
MCH RBC QN AUTO: 31.5 PG (ref 26–34)
MCHC RBC AUTO-ENTMCNC: 32.8 G/DL (ref 32–36)
MCV RBC AUTO: 96 FL (ref 80–100)
MONOCYTES # BLD AUTO: 0.48 X10*3/UL (ref 0.05–0.8)
MONOCYTES NFR BLD AUTO: 8.7 %
NEUTROPHILS # BLD AUTO: 3.53 X10*3/UL (ref 1.6–5.5)
NEUTROPHILS NFR BLD AUTO: 63.7 %
NON HDL CHOLESTEROL: 90 MG/DL (ref 0–149)
NRBC BLD-RTO: 0 /100 WBCS (ref 0–0)
PLATELET # BLD AUTO: 271 X10*3/UL (ref 150–450)
POTASSIUM SERPL-SCNC: 4 MMOL/L (ref 3.5–5.3)
PROT SERPL-MCNC: 6.6 G/DL (ref 6.4–8.2)
PSA SERPL-MCNC: 0.38 NG/ML
RBC # BLD AUTO: 5.02 X10*6/UL (ref 4.5–5.9)
SODIUM SERPL-SCNC: 141 MMOL/L (ref 136–145)
TRIGL SERPL-MCNC: 64 MG/DL (ref 0–149)
TSH SERPL-ACNC: 1.66 MIU/L (ref 0.44–3.98)
VLDL: 13 MG/DL (ref 0–40)
WBC # BLD AUTO: 5.5 X10*3/UL (ref 4.4–11.3)

## 2024-05-17 PROCEDURE — 82550 ASSAY OF CK (CPK): CPT

## 2024-05-17 PROCEDURE — 80053 COMPREHEN METABOLIC PANEL: CPT

## 2024-05-17 PROCEDURE — 84443 ASSAY THYROID STIM HORMONE: CPT

## 2024-05-17 PROCEDURE — 85025 COMPLETE CBC W/AUTO DIFF WBC: CPT

## 2024-05-17 PROCEDURE — 83036 HEMOGLOBIN GLYCOSYLATED A1C: CPT

## 2024-05-17 PROCEDURE — 36415 COLL VENOUS BLD VENIPUNCTURE: CPT

## 2024-05-17 PROCEDURE — 80061 LIPID PANEL: CPT

## 2024-05-17 PROCEDURE — 84153 ASSAY OF PSA TOTAL: CPT

## 2024-05-29 ENCOUNTER — OFFICE VISIT (OUTPATIENT)
Dept: PRIMARY CARE | Facility: CLINIC | Age: 89
End: 2024-05-29
Payer: MEDICARE

## 2024-05-29 VITALS
BODY MASS INDEX: 34.54 KG/M2 | DIASTOLIC BLOOD PRESSURE: 89 MMHG | HEIGHT: 72 IN | HEART RATE: 80 BPM | WEIGHT: 255 LBS | SYSTOLIC BLOOD PRESSURE: 136 MMHG

## 2024-05-29 DIAGNOSIS — R73.01 IMPAIRED FASTING GLUCOSE: ICD-10-CM

## 2024-05-29 DIAGNOSIS — E78.00 PURE HYPERCHOLESTEROLEMIA: ICD-10-CM

## 2024-05-29 DIAGNOSIS — I10 ESSENTIAL (PRIMARY) HYPERTENSION: ICD-10-CM

## 2024-05-29 DIAGNOSIS — I48.91 ATRIAL FIBRILLATION, UNSPECIFIED TYPE (MULTI): ICD-10-CM

## 2024-05-29 DIAGNOSIS — E78.5 DYSLIPIDEMIA, GOAL LDL BELOW 100: ICD-10-CM

## 2024-05-29 DIAGNOSIS — Z00.00 ROUTINE GENERAL MEDICAL EXAMINATION AT HEALTH CARE FACILITY: Primary | ICD-10-CM

## 2024-05-29 PROBLEM — R06.83 SNORING: Status: ACTIVE | Noted: 2024-05-29

## 2024-05-29 PROCEDURE — 99214 OFFICE O/P EST MOD 30 MIN: CPT | Performed by: INTERNAL MEDICINE

## 2024-05-29 PROCEDURE — 3075F SYST BP GE 130 - 139MM HG: CPT | Performed by: INTERNAL MEDICINE

## 2024-05-29 PROCEDURE — 1159F MED LIST DOCD IN RCRD: CPT | Performed by: INTERNAL MEDICINE

## 2024-05-29 PROCEDURE — 1170F FXNL STATUS ASSESSED: CPT | Performed by: INTERNAL MEDICINE

## 2024-05-29 PROCEDURE — 1157F ADVNC CARE PLAN IN RCRD: CPT | Performed by: INTERNAL MEDICINE

## 2024-05-29 PROCEDURE — 1036F TOBACCO NON-USER: CPT | Performed by: INTERNAL MEDICINE

## 2024-05-29 PROCEDURE — G0439 PPPS, SUBSEQ VISIT: HCPCS | Performed by: INTERNAL MEDICINE

## 2024-05-29 PROCEDURE — 3079F DIAST BP 80-89 MM HG: CPT | Performed by: INTERNAL MEDICINE

## 2024-05-29 PROCEDURE — 1124F ACP DISCUSS-NO DSCNMKR DOCD: CPT | Performed by: INTERNAL MEDICINE

## 2024-05-29 PROCEDURE — 1160F RVW MEDS BY RX/DR IN RCRD: CPT | Performed by: INTERNAL MEDICINE

## 2024-05-29 RX ORDER — HYDROCHLOROTHIAZIDE 25 MG/1
12.5 TABLET ORAL DAILY
Start: 2024-05-29

## 2024-05-29 ASSESSMENT — PATIENT HEALTH QUESTIONNAIRE - PHQ9
SUM OF ALL RESPONSES TO PHQ9 QUESTIONS 1 AND 2: 0
2. FEELING DOWN, DEPRESSED OR HOPELESS: NOT AT ALL
1. LITTLE INTEREST OR PLEASURE IN DOING THINGS: NOT AT ALL
1. LITTLE INTEREST OR PLEASURE IN DOING THINGS: NOT AT ALL
2. FEELING DOWN, DEPRESSED OR HOPELESS: NOT AT ALL
SUM OF ALL RESPONSES TO PHQ9 QUESTIONS 1 AND 2: 0

## 2024-05-29 ASSESSMENT — ACTIVITIES OF DAILY LIVING (ADL)
DRESSING: INDEPENDENT
TAKING_MEDICATION: INDEPENDENT
DOING_HOUSEWORK: INDEPENDENT
BATHING: INDEPENDENT
MANAGING_FINANCES: INDEPENDENT
GROCERY_SHOPPING: INDEPENDENT

## 2024-05-29 NOTE — PROGRESS NOTES
Subjective   Reason for Visit: Rayo Padgett is an 88 y.o. male here for a Medicare Wellness visit.     Past Medical, Surgical, and Family History reviewed and updated in chart.    Reviewed all medications by prescribing practitioner or clinical pharmacist (such as prescriptions, OTCs, herbal therapies and supplements) and documented in the medical record.    Has a lot of old records from prior doctors. Should he keep them.    His home bp 120/70 at home.   No sob  No cp or pressure  Bowels are regular  Urine flow is good, nocturia 2x  Stream is strong.     His concerns, has no friends, being out here has changed that.           Patient Care Team:  Debbie La DO as PCP - General  Debbie La DO as PCP - [facility] ACO Attributed Provider     Review of Systems    Objective   Vitals:  /89   Pulse 80   Ht 1.829 m (6')   Wt 116 kg (255 lb)   BMI 34.58 kg/m²       Physical Exam  Constitutional:       Appearance: Normal appearance.   Neck:      Vascular: No carotid bruit.   Cardiovascular:      Rate and Rhythm: Normal rate. Rhythm irregular.      Heart sounds: No murmur heard.  Pulmonary:      Effort: Pulmonary effort is normal.      Breath sounds: Normal breath sounds.   Chest:   Breasts:     Right: No mass.      Left: No mass.   Abdominal:      General: Abdomen is flat.      Palpations: Abdomen is soft. There is no mass.      Tenderness: There is no abdominal tenderness.      Comments: No HSM or pulsatile mass   Musculoskeletal:      Right lower leg: No edema.      Left lower leg: No edema.   Lymphadenopathy:      Cervical: No cervical adenopathy.      Upper Body:      Right upper body: No supraclavicular adenopathy.      Left upper body: No supraclavicular adenopathy.   Skin:     Coloration: Skin is not jaundiced or pale.   Neurological:      Mental Status: He is alert and oriented to person, place, and time.   Psychiatric:         Mood and Affect: Mood normal.         Behavior: Behavior normal.          Assessment/Plan   Problem List Items Addressed This Visit    None  Visit Diagnoses       Routine general medical examination at health care facility    -  Primary          Patient Instructions   Thoracic aneurysm, unchanged since discovery in 2018, at 4.3cm, repeat ct in 2-3 years  Keep bp controlled    Hypertension, bp is well controlled, but kidney function up slightly  Cut the hydrochlorothiazide 25mg to 1/2 tab daily, repeat kidney function in 1 month and if not better will get kidney ultrasound    Cholesterol is well controlled, on atorvastatin, LDL goal under 100    Afib, rate controlled, continue xarelto.   Follows with cardiology    Prediabetes has resolved, limit carbs/sweets and get back to the gym    Social concerns, discussed getting involved in areas of your interest to meet people of same interests, gym, golf, senior center, [employer] activities, volunteering.     Recheck in 1 year and as needed

## 2024-05-29 NOTE — PATIENT INSTRUCTIONS
Thoracic aneurysm, unchanged since discovery in 2018, at 4.3cm, repeat ct in 2-3 years  Keep bp controlled    Hypertension, bp is well controlled, but kidney function up slightly  Cut the hydrochlorothiazide 25mg to 1/2 tab daily, repeat kidney function in 1 month and if not better will get kidney ultrasound    Cholesterol is well controlled, on atorvastatin, LDL goal under 100    Afib, rate controlled, continue xarelto.   Follows with cardiology    Prediabetes has resolved, limit carbs/sweets and get back to the gym    Social concerns, discussed getting involved in areas of your interest to meet people of same interests, gym, golf, senior center, [employer] activities, volunteering.     Recheck in 1 year and as needed

## 2024-05-29 NOTE — PROGRESS NOTES
Subjective   Patient ID: Rayo Padgett is a 88 y.o. male who presents for Medicare Annual Wellness Visit Subsequent.    HPI     Review of Systems    Objective   There were no vitals taken for this visit.    Physical Exam    Assessment/Plan

## 2024-07-05 ENCOUNTER — LAB (OUTPATIENT)
Dept: LAB | Facility: LAB | Age: 89
End: 2024-07-05
Payer: MEDICARE

## 2024-07-05 DIAGNOSIS — I10 ESSENTIAL (PRIMARY) HYPERTENSION: ICD-10-CM

## 2024-07-05 LAB
ANION GAP SERPL CALC-SCNC: 11 MMOL/L (ref 10–20)
BUN SERPL-MCNC: 19 MG/DL (ref 6–23)
CALCIUM SERPL-MCNC: 8.6 MG/DL (ref 8.6–10.3)
CHLORIDE SERPL-SCNC: 103 MMOL/L (ref 98–107)
CO2 SERPL-SCNC: 30 MMOL/L (ref 21–32)
CREAT SERPL-MCNC: 0.95 MG/DL (ref 0.5–1.3)
EGFRCR SERPLBLD CKD-EPI 2021: 78 ML/MIN/1.73M*2
GLUCOSE SERPL-MCNC: 98 MG/DL (ref 74–99)
POTASSIUM SERPL-SCNC: 3.9 MMOL/L (ref 3.5–5.3)
SODIUM SERPL-SCNC: 140 MMOL/L (ref 136–145)

## 2024-07-05 PROCEDURE — 80048 BASIC METABOLIC PNL TOTAL CA: CPT

## 2024-07-05 PROCEDURE — 36415 COLL VENOUS BLD VENIPUNCTURE: CPT

## 2024-10-30 DIAGNOSIS — R79.89 OTHER SPECIFIED ABNORMAL FINDINGS OF BLOOD CHEMISTRY: ICD-10-CM

## 2024-10-30 DIAGNOSIS — I10 ESSENTIAL (PRIMARY) HYPERTENSION: ICD-10-CM

## 2024-10-31 RX ORDER — AMLODIPINE BESYLATE 5 MG/1
TABLET ORAL
Qty: 30 TABLET | Refills: 0 | Status: SHIPPED | OUTPATIENT
Start: 2024-10-31

## 2024-10-31 RX ORDER — LOSARTAN POTASSIUM 100 MG/1
TABLET ORAL
Qty: 30 TABLET | Refills: 0 | Status: SHIPPED | OUTPATIENT
Start: 2024-10-31

## 2024-11-20 PROBLEM — H61.20 CERUMEN IMPACTION: Status: RESOLVED | Noted: 2023-05-05 | Resolved: 2024-11-20

## 2024-11-20 PROBLEM — G47.31 CENTRAL SLEEP APNEA: Status: ACTIVE | Noted: 2024-11-20

## 2024-11-21 ENCOUNTER — APPOINTMENT (OUTPATIENT)
Dept: PRIMARY CARE | Facility: CLINIC | Age: 89
End: 2024-11-21
Payer: MEDICARE

## 2024-11-21 VITALS
WEIGHT: 260 LBS | DIASTOLIC BLOOD PRESSURE: 72 MMHG | HEIGHT: 72 IN | HEART RATE: 54 BPM | BODY MASS INDEX: 35.21 KG/M2 | OXYGEN SATURATION: 96 % | SYSTOLIC BLOOD PRESSURE: 134 MMHG

## 2024-11-21 DIAGNOSIS — I71.20 THORACIC AORTIC ANEURYSM WITHOUT RUPTURE, UNSPECIFIED PART (CMS-HCC): ICD-10-CM

## 2024-11-21 DIAGNOSIS — E55.9 VITAMIN D DEFICIENCY: ICD-10-CM

## 2024-11-21 DIAGNOSIS — I10 PRIMARY HYPERTENSION: ICD-10-CM

## 2024-11-21 DIAGNOSIS — Z00.00 ROUTINE ADULT HEALTH MAINTENANCE: Primary | ICD-10-CM

## 2024-11-21 DIAGNOSIS — Z86.73 HISTORY OF MULTIPLE STROKES: ICD-10-CM

## 2024-11-21 DIAGNOSIS — G47.31 CENTRAL SLEEP APNEA: ICD-10-CM

## 2024-11-21 DIAGNOSIS — I48.91 ATRIAL FIBRILLATION, UNSPECIFIED TYPE (MULTI): ICD-10-CM

## 2024-11-21 DIAGNOSIS — E78.00 PURE HYPERCHOLESTEROLEMIA: ICD-10-CM

## 2024-11-21 DIAGNOSIS — R73.09 BLOOD GLUCOSE ABNORMAL: ICD-10-CM

## 2024-11-21 PROCEDURE — 99214 OFFICE O/P EST MOD 30 MIN: CPT | Performed by: NURSE PRACTITIONER

## 2024-11-21 PROCEDURE — 1160F RVW MEDS BY RX/DR IN RCRD: CPT | Performed by: NURSE PRACTITIONER

## 2024-11-21 PROCEDURE — G2211 COMPLEX E/M VISIT ADD ON: HCPCS | Performed by: NURSE PRACTITIONER

## 2024-11-21 PROCEDURE — 1159F MED LIST DOCD IN RCRD: CPT | Performed by: NURSE PRACTITIONER

## 2024-11-21 PROCEDURE — 3075F SYST BP GE 130 - 139MM HG: CPT | Performed by: NURSE PRACTITIONER

## 2024-11-21 PROCEDURE — 3078F DIAST BP <80 MM HG: CPT | Performed by: NURSE PRACTITIONER

## 2024-11-21 PROCEDURE — 1157F ADVNC CARE PLAN IN RCRD: CPT | Performed by: NURSE PRACTITIONER

## 2024-11-21 RX ORDER — FLUOROURACIL 50 MG/G
1 CREAM TOPICAL 2 TIMES DAILY
COMMUNITY
Start: 2024-11-13

## 2024-11-21 ASSESSMENT — ENCOUNTER SYMPTOMS
BRUISES/BLEEDS EASILY: 0
FATIGUE: 0
DIZZINESS: 0
WOUND: 0
DIARRHEA: 0
HEADACHES: 0
JOINT SWELLING: 0
ADENOPATHY: 0
WHEEZING: 0
MYALGIAS: 0
COLOR CHANGE: 0
SEIZURES: 0
CHILLS: 0
BACK PAIN: 0
COUGH: 0
SHORTNESS OF BREATH: 0
ABDOMINAL DISTENTION: 0
NAUSEA: 0
ABDOMINAL PAIN: 0
FEVER: 0
PALPITATIONS: 0
TROUBLE SWALLOWING: 0
EYE PAIN: 0
WEAKNESS: 0
CONSTIPATION: 0
DIFFICULTY URINATING: 0

## 2024-11-21 NOTE — PATIENT INSTRUCTIONS
You may have your blood work completed in this building through [facility] ([address]  [address], [place] [address]).  Hours:   Monday - Friday: 7:30 a.m. - 5 p.m. and Saturday: 8 a.m. - 12 p.m.  Phone:  [phone]     Please arrange for routine follow up in 6 months

## 2024-11-21 NOTE — PROGRESS NOTES
"Subjective   Patient ID: Rayo Padgett is a 89 y.o. male who presents for Follow-up.    Patient seen today in order to establish primary care.  Present for today's assessment is patient's spouse.  Patient's spouse has dementia and he is her primary caregiver.  Private duty caregivers come to the residence twice weekly to assist with her care and then she goes to \"adult day care\" 2 other days during the week..  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  Patient does not exercise routinely but denies any issues with shortness of breath or chest pain upon exertion he has DELANEY and wears a CPAP routinely at night.  He does not smoke but reports approximately 2 glasses of alcohol in the evenings.  No reported issues with appetite, staying hydrated, bowel or bladder.  Patient follows routinely with cardiology, Dr. Chowdhury, for A-fib and thoracic aneurysm.  Patient does not monitor his vital signs or weights routinely but denies any issues with chest pain/pressure, headaches, blurred vision or other cardiac concerns.  History of 2 previous strokes without any significant deficits reported.  Patient denies any recent neurology follow-up.  Although patient denies any discrete issues with anxiety or depression he does report frustration and stress secondary to his spouse.  Medications reviewed.  No other acute concerns voiced at this time.       Current Outpatient Medications on File Prior to Visit   Medication Sig Dispense Refill    amLODIPine (Norvasc) 5 mg tablet TAKE 1 TABLET BY MOUTH EVERY DAY 30 tablet 0    aspirin 81 mg capsule Take 1 tablet by mouth once daily.      cholecalciferol (Vitamin D-3) 50 mcg (2,000 unit) capsule Take by mouth.      fluorouracil (Efudex) 5 % cream cream Apply 1 Application topically 2 times a day.   "    hydroCHLOROthiazide (HYDRODiuril) 25 mg tablet Take 0.5 tablets (12.5 mg) by mouth once daily.      Lipitor 10 mg tablet Take 1 tablet (10 mg) by mouth once daily at bedtime.      Xarelto 20 mg tablet Take 1 tablet (20 mg) by mouth once daily.       No current facility-administered medications on file prior to visit.       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    Headache, unspecified 02/22/2017    Chronic daily headache        Past Surgical History:   Procedure Laterality Date    MR HEAD ANGIO WO IV CONTRAST  6/5/2015    MR HEAD ANGIO WO IV CONTRAST 6/5/2015 [facility]ICAL [facility]GAC[facility]    MR HEAD ANGIO WO IV CONTRAST  2/9/2017    MR HEAD ANGIO WO IV CONTRAST 2/9/2017 [facility]    MR NECK ANGIO WO IV CONTRAST  6/5/2015    MR NECK ANGIO WO IV CONTRAST 6/5/2015 [facility] CLINICAL LEGACY    OTHER SURGICAL HISTORY  11/14/2019    Skin biopsy    OTHER SURGICAL HISTORY  11/14/2019    Hernia repair        No family history on file.     Review of Systems   Constitutional:  Negative for chills, fatigue and fever.   HENT:  Negative for dental problem and trouble swallowing.    Eyes:  Negative for pain and visual disturbance.        Wears glasses occasionally   Respiratory:  Negative for cough, shortness of breath and wheezing.    Cardiovascular:  Negative for chest pain, palpitations and leg swelling.   Gastrointestinal:  Negative for abdominal distention, abdominal pain, constipation, diarrhea and nausea.   Endocrine: Negative for cold intolerance and heat intolerance.   Genitourinary:  Negative for difficulty urinating.   Musculoskeletal:  Negative for back pain, gait problem, joint swelling and myalgias.   Skin:  Negative for color change, pallor, rash and wound.   Allergic/Immunologic: Negative for environmental allergies and food allergies.   Neurological:  Negative for dizziness, seizures, weakness and headaches.        History of past strokes    Hematological:  Negative for adenopathy. Does not bruise/bleed easily.   Psychiatric/Behavioral:  Negative for behavioral problems.    All other systems reviewed and are negative.      Objective   /72   Pulse 54   Ht 1.829 m (6')   Wt 118 kg (260 lb)   SpO2 96%   BMI 35.26 kg/m²     Physical Exam  Constitutional:       General: He is not in acute distress.     Appearance: Normal appearance. He is not toxic-appearing.   HENT:      Head: Normocephalic and atraumatic.      Right Ear: Tympanic membrane, ear canal and external ear normal.      Left Ear: Tympanic membrane, ear canal and external ear normal.      Nose: Nose normal.      Mouth/Throat:      Mouth: Mucous membranes are moist.      Pharynx: Oropharynx is clear.   Eyes:      Extraocular Movements: Extraocular movements intact.      Conjunctiva/sclera: Conjunctivae normal.      Pupils: Pupils are equal, round, and reactive to light.   Cardiovascular:      Rate and Rhythm: Normal rate. Rhythm irregular.      Pulses: Normal pulses.      Heart sounds: Normal heart sounds. No murmur heard.  Pulmonary:      Effort: Pulmonary effort is normal.      Breath sounds: Normal breath sounds. No wheezing.   Abdominal:      General: Bowel sounds are normal.      Palpations: Abdomen is soft.   Musculoskeletal:         General: No swelling. Normal range of motion.      Cervical back: Normal range of motion and neck supple.   Skin:     General: Skin is warm and dry.   Neurological:      General: No focal deficit present.      Mental Status: He is alert and oriented to person, place, and time. Mental status is at baseline.      Cranial Nerves: No cranial nerve deficit.      Motor: No weakness.   Psychiatric:         Mood and Affect: Mood normal.         Behavior: Behavior normal.         Thought Content: Thought content normal.         Judgment: Judgment normal.         Assessment/Plan   Problem List Items Addressed This Visit             ICD-10-CM    Afib (Multi)  "I48.91     Rate controlled, on Xarelto, followed by Dr. Chowdhury for cardiology.  Patient to notify provider for any persistent issues with excessive bruising or bleeding.         Hyperlipidemia E78.5     Patient currently off of any antilipid Shannon agent.  Will continue to monitor fasting cholesterol panel routinely.         Hypertension I10     Relatively stable on current medication regiment.  Will continue to monitor vital signs at subsequent visits.  Patient to maintain routine follow-up with cardiology specialist and notify provider for any new cardiac concerns.         Relevant Orders    Comprehensive Metabolic Panel    CBC and Auto Differential    Thoracic aortic aneurysm (CMS-HCC) I71.20     Unchanged since the discovery in 2018.  Recent imaging completed in July, 2023 and showed \"There is stable mild aneurysmal dilatation of the thoracic aorta. 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  Patient to maintain routine follow-up with sleep medicine specialist for continued evaluation and treatment recommendations         Routine adult health maintenance - Primary Z00.00     Routine blood work ordered today for assessment purposes  -Recent colonoscopy on file?  Most recent fecal occult testing completed in 2023 and was negative for blood.         Relevant Orders    Prostate Specific Antigen    Hemoglobin A1C    Comprehensive Metabolic Panel    TSH with reflex to Free T4 if abnormal    Vitamin D 25-Hydroxy,Total (for eval of Vitamin D levels)    CBC and Auto Differential    Lipid Panel Non-Fasting    History of multiple strokes Z86.73     Recent neurology follow-up completed.  Patient continues on daily baby aspirin.  Cardiology follow-up for blood pressure control          Other Visit Diagnoses         Codes    Blood glucose abnormal     R73.09    Relevant Orders    Hemoglobin A1C    Vitamin D deficiency     E55.9    " Relevant Orders    Vitamin D 25-Hydroxy,Total (for eval of Vitamin D levels)

## 2024-11-23 DIAGNOSIS — R79.89 OTHER SPECIFIED ABNORMAL FINDINGS OF BLOOD CHEMISTRY: ICD-10-CM

## 2024-11-25 RX ORDER — LOSARTAN POTASSIUM 100 MG/1
TABLET ORAL
Qty: 30 TABLET | Refills: 0 | Status: SHIPPED | OUTPATIENT
Start: 2024-11-25

## 2024-12-02 PROBLEM — R06.83 SNORING: Status: RESOLVED | Noted: 2024-05-29 | Resolved: 2024-12-02

## 2024-12-02 PROBLEM — R79.89 ELEVATED BRAIN NATRIURETIC PEPTIDE (BNP) LEVEL: Status: RESOLVED | Noted: 2023-05-05 | Resolved: 2024-12-02

## 2024-12-02 PROBLEM — Z00.00 ROUTINE ADULT HEALTH MAINTENANCE: Status: ACTIVE | Noted: 2024-12-02

## 2024-12-02 PROBLEM — J30.2 SEASONAL ALLERGIES: Status: RESOLVED | Noted: 2023-05-05 | Resolved: 2024-12-02

## 2024-12-02 PROBLEM — E66.9 OBESITY (BMI 30-39.9): Status: RESOLVED | Noted: 2023-05-05 | Resolved: 2024-12-02

## 2024-12-02 PROBLEM — Z86.73 HISTORY OF MULTIPLE STROKES: Status: ACTIVE | Noted: 2024-12-02

## 2024-12-02 PROBLEM — R80.9 PROTEINURIA, UNSPECIFIED: Status: RESOLVED | Noted: 2023-05-05 | Resolved: 2024-12-02

## 2024-12-02 PROBLEM — R35.1 NOCTURIA: Status: RESOLVED | Noted: 2023-05-05 | Resolved: 2024-12-02

## 2024-12-02 PROBLEM — R73.01 IMPAIRED FASTING GLUCOSE: Status: RESOLVED | Noted: 2023-05-05 | Resolved: 2024-12-02

## 2024-12-02 NOTE — ASSESSMENT & PLAN NOTE
Patient currently off of any antilipid Shannon agent.  Will continue to monitor fasting cholesterol panel routinely.

## 2024-12-02 NOTE — ASSESSMENT & PLAN NOTE
Relatively stable on current medication regiment.  Will continue to monitor vital signs at subsequent visits.  Patient to maintain routine follow-up with cardiology specialist and notify provider for any new cardiac concerns.

## 2024-12-02 NOTE — ASSESSMENT & PLAN NOTE
Routine blood work ordered today for assessment purposes  -Recent colonoscopy on file?  Most recent fecal occult testing completed in 2023 and was negative for blood.

## 2024-12-02 NOTE — ASSESSMENT & PLAN NOTE
CPAP at at bedtime.  Patient to maintain routine follow-up with sleep medicine specialist for continued evaluation and treatment recommendations

## 2024-12-02 NOTE — ASSESSMENT & PLAN NOTE
"Unchanged since the discovery in 2018.  Recent imaging completed in July, 2023 and showed \"There is stable mild aneurysmal dilatation of the thoracic aorta. Continued surveillance with 2 year contrasted chest CT can be   Performed\"  "

## 2024-12-02 NOTE — ASSESSMENT & PLAN NOTE
Recent neurology follow-up completed.  Patient continues on daily baby aspirin.  Cardiology follow-up for blood pressure control

## 2024-12-03 DIAGNOSIS — I10 ESSENTIAL (PRIMARY) HYPERTENSION: ICD-10-CM

## 2024-12-05 RX ORDER — AMLODIPINE BESYLATE 5 MG/1
TABLET ORAL
Qty: 90 TABLET | Refills: 1 | Status: SHIPPED | OUTPATIENT
Start: 2024-12-05

## 2025-01-06 DIAGNOSIS — R79.89 OTHER SPECIFIED ABNORMAL FINDINGS OF BLOOD CHEMISTRY: ICD-10-CM

## 2025-01-06 RX ORDER — LOSARTAN POTASSIUM 100 MG/1
100 TABLET ORAL DAILY
Qty: 30 TABLET | Refills: 0 | Status: SHIPPED | OUTPATIENT
Start: 2025-01-06 | End: 2025-01-07 | Stop reason: SDUPTHER

## 2025-01-07 ENCOUNTER — LAB (OUTPATIENT)
Dept: LAB | Facility: LAB | Age: 89
End: 2025-01-07
Payer: MEDICARE

## 2025-01-07 DIAGNOSIS — R73.09 BLOOD GLUCOSE ABNORMAL: ICD-10-CM

## 2025-01-07 DIAGNOSIS — I10 PRIMARY HYPERTENSION: ICD-10-CM

## 2025-01-07 DIAGNOSIS — R79.89 OTHER SPECIFIED ABNORMAL FINDINGS OF BLOOD CHEMISTRY: ICD-10-CM

## 2025-01-07 DIAGNOSIS — E55.9 VITAMIN D DEFICIENCY: ICD-10-CM

## 2025-01-07 DIAGNOSIS — Z00.00 ROUTINE ADULT HEALTH MAINTENANCE: ICD-10-CM

## 2025-01-07 DIAGNOSIS — R17 ELEVATED BILIRUBIN: Primary | ICD-10-CM

## 2025-01-07 LAB
25(OH)D3 SERPL-MCNC: 39 NG/ML (ref 30–100)
ALBUMIN SERPL BCP-MCNC: 4.2 G/DL (ref 3.4–5)
ALP SERPL-CCNC: 72 U/L (ref 33–136)
ALT SERPL W P-5'-P-CCNC: 18 U/L (ref 10–52)
ANION GAP SERPL CALC-SCNC: 16 MMOL/L (ref 10–20)
AST SERPL W P-5'-P-CCNC: 19 U/L (ref 9–39)
BASOPHILS # BLD AUTO: 0.02 X10*3/UL (ref 0–0.1)
BASOPHILS NFR BLD AUTO: 0.4 %
BILIRUB SERPL-MCNC: 1.7 MG/DL (ref 0–1.2)
BUN SERPL-MCNC: 21 MG/DL (ref 6–23)
CALCIUM SERPL-MCNC: 8.7 MG/DL (ref 8.6–10.3)
CHLORIDE SERPL-SCNC: 99 MMOL/L (ref 98–107)
CHOLEST SERPL-MCNC: 158 MG/DL (ref 0–199)
CHOLESTEROL/HDL RATIO: 2.6
CO2 SERPL-SCNC: 29 MMOL/L (ref 21–32)
CREAT SERPL-MCNC: 1.06 MG/DL (ref 0.5–1.3)
EGFRCR SERPLBLD CKD-EPI 2021: 68 ML/MIN/1.73M*2
EOSINOPHIL # BLD AUTO: 0.2 X10*3/UL (ref 0–0.4)
EOSINOPHIL NFR BLD AUTO: 3.6 %
ERYTHROCYTE [DISTWIDTH] IN BLOOD BY AUTOMATED COUNT: 13.1 % (ref 11.5–14.5)
EST. AVERAGE GLUCOSE BLD GHB EST-MCNC: 108 MG/DL
GLUCOSE SERPL-MCNC: 84 MG/DL (ref 74–99)
HBA1C MFR BLD: 5.4 %
HCT VFR BLD AUTO: 48.8 % (ref 41–52)
HDLC SERPL-MCNC: 60.8 MG/DL
HGB BLD-MCNC: 16.1 G/DL (ref 13.5–17.5)
IMM GRANULOCYTES # BLD AUTO: 0.03 X10*3/UL (ref 0–0.5)
IMM GRANULOCYTES NFR BLD AUTO: 0.5 % (ref 0–0.9)
LYMPHOCYTES # BLD AUTO: 1.63 X10*3/UL (ref 0.8–3)
LYMPHOCYTES NFR BLD AUTO: 29.4 %
MCH RBC QN AUTO: 31.4 PG (ref 26–34)
MCHC RBC AUTO-ENTMCNC: 33 G/DL (ref 32–36)
MCV RBC AUTO: 95 FL (ref 80–100)
MONOCYTES # BLD AUTO: 0.51 X10*3/UL (ref 0.05–0.8)
MONOCYTES NFR BLD AUTO: 9.2 %
NEUTROPHILS # BLD AUTO: 3.15 X10*3/UL (ref 1.6–5.5)
NEUTROPHILS NFR BLD AUTO: 56.9 %
NON-HDL CHOLESTEROL: 97 MG/DL (ref 0–149)
NRBC BLD-RTO: 0 /100 WBCS (ref 0–0)
PLATELET # BLD AUTO: 267 X10*3/UL (ref 150–450)
POTASSIUM SERPL-SCNC: 4.1 MMOL/L (ref 3.5–5.3)
PROT SERPL-MCNC: 6.6 G/DL (ref 6.4–8.2)
PSA SERPL-MCNC: 0.47 NG/ML
RBC # BLD AUTO: 5.12 X10*6/UL (ref 4.5–5.9)
SODIUM SERPL-SCNC: 140 MMOL/L (ref 136–145)
TSH SERPL-ACNC: 1.73 MIU/L (ref 0.44–3.98)
WBC # BLD AUTO: 5.5 X10*3/UL (ref 4.4–11.3)

## 2025-01-07 PROCEDURE — 84443 ASSAY THYROID STIM HORMONE: CPT

## 2025-01-07 PROCEDURE — 82306 VITAMIN D 25 HYDROXY: CPT

## 2025-01-07 PROCEDURE — 80053 COMPREHEN METABOLIC PANEL: CPT

## 2025-01-07 PROCEDURE — 84153 ASSAY OF PSA TOTAL: CPT

## 2025-01-07 PROCEDURE — 83718 ASSAY OF LIPOPROTEIN: CPT

## 2025-01-07 PROCEDURE — 83036 HEMOGLOBIN GLYCOSYLATED A1C: CPT

## 2025-01-07 PROCEDURE — 85025 COMPLETE CBC W/AUTO DIFF WBC: CPT

## 2025-01-07 PROCEDURE — 82465 ASSAY BLD/SERUM CHOLESTEROL: CPT

## 2025-01-07 RX ORDER — LOSARTAN POTASSIUM 100 MG/1
100 TABLET ORAL DAILY
Qty: 30 TABLET | Refills: 11 | Status: SHIPPED | OUTPATIENT
Start: 2025-01-07

## 2025-01-13 ENCOUNTER — TELEPHONE (OUTPATIENT)
Dept: PRIMARY CARE | Facility: CLINIC | Age: 89
End: 2025-01-13
Payer: MEDICARE

## 2025-01-13 NOTE — TELEPHONE ENCOUNTER
----- Message from Nelly Black sent at 1/9/2025 12:39 PM EST -----  Can you please update patient regarding most recent blood work?  Hemoglobin A1c/blood glucose levels, vitamin D, cholesterol levels, thyroid levels, prostate levels and blood counts are all normal.  Kidney disease is stable but bilirubin levels are slightly elevated.  Can you please ask patient if this is never been worked up in the past?  Orders are in place for patient to have this rechecked in 1 month.

## 2025-03-18 ENCOUNTER — TELEPHONE (OUTPATIENT)
Dept: PRIMARY CARE | Facility: CLINIC | Age: 89
End: 2025-03-18
Payer: MEDICARE

## 2025-03-18 DIAGNOSIS — I10 ESSENTIAL (PRIMARY) HYPERTENSION: ICD-10-CM

## 2025-03-18 DIAGNOSIS — R79.89 OTHER SPECIFIED ABNORMAL FINDINGS OF BLOOD CHEMISTRY: ICD-10-CM

## 2025-03-18 NOTE — TELEPHONE ENCOUNTER
**PT IS OUT OF THE AMLODIPINE**    Rx Refill Request Telephone Encounter    Name:  Rayo Padgett  :  [id number]  Medication Name:    LOSARTAN 100MG Q/D 90 DAY   AMLODIPINE 5MG Q/D   Hydrochlorothiazide 25MG Q/D   Specific Pharmacy location:  [facility]   Date of last appointment:  2024  Date of next appointment:  2025  Best number to reach patient:  [phone]

## 2025-03-19 RX ORDER — LOSARTAN POTASSIUM 100 MG/1
100 TABLET ORAL DAILY
Qty: 30 TABLET | Refills: 0 | Status: SHIPPED | OUTPATIENT
Start: 2025-03-19

## 2025-03-19 RX ORDER — HYDROCHLOROTHIAZIDE 25 MG/1
12.5 TABLET ORAL DAILY
Qty: 45 TABLET | Refills: 0 | Status: SHIPPED | OUTPATIENT
Start: 2025-03-19

## 2025-03-19 RX ORDER — AMLODIPINE BESYLATE 5 MG/1
5 TABLET ORAL DAILY
Qty: 90 TABLET | Refills: 0 | Status: SHIPPED | OUTPATIENT
Start: 2025-03-19

## 2025-04-16 ENCOUNTER — OFFICE VISIT (OUTPATIENT)
Dept: SLEEP MEDICINE | Facility: CLINIC | Age: 89
End: 2025-04-16
Payer: MEDICARE

## 2025-04-16 VITALS
WEIGHT: 260 LBS | OXYGEN SATURATION: 96 % | BODY MASS INDEX: 35.26 KG/M2 | SYSTOLIC BLOOD PRESSURE: 107 MMHG | DIASTOLIC BLOOD PRESSURE: 69 MMHG | RESPIRATION RATE: 16 BRPM | HEART RATE: 85 BPM

## 2025-04-16 DIAGNOSIS — R06.3 CHEYNE-STOKES RESPIRATION: ICD-10-CM

## 2025-04-16 DIAGNOSIS — G47.39 TREATMENT-EMERGENT CENTRAL SLEEP APNEA: ICD-10-CM

## 2025-04-16 DIAGNOSIS — G47.33 OBSTRUCTIVE SLEEP APNEA: ICD-10-CM

## 2025-04-16 DIAGNOSIS — G47.33 OSA ON CPAP: Primary | ICD-10-CM

## 2025-04-16 PROCEDURE — 3078F DIAST BP <80 MM HG: CPT | Performed by: INTERNAL MEDICINE

## 2025-04-16 PROCEDURE — 99214 OFFICE O/P EST MOD 30 MIN: CPT | Performed by: INTERNAL MEDICINE

## 2025-04-16 PROCEDURE — 1159F MED LIST DOCD IN RCRD: CPT | Performed by: INTERNAL MEDICINE

## 2025-04-16 PROCEDURE — 1160F RVW MEDS BY RX/DR IN RCRD: CPT | Performed by: INTERNAL MEDICINE

## 2025-04-16 PROCEDURE — G2211 COMPLEX E/M VISIT ADD ON: HCPCS | Performed by: INTERNAL MEDICINE

## 2025-04-16 PROCEDURE — 1126F AMNT PAIN NOTED NONE PRSNT: CPT | Performed by: INTERNAL MEDICINE

## 2025-04-16 PROCEDURE — 1157F ADVNC CARE PLAN IN RCRD: CPT | Performed by: INTERNAL MEDICINE

## 2025-04-16 PROCEDURE — 3074F SYST BP LT 130 MM HG: CPT | Performed by: INTERNAL MEDICINE

## 2025-04-16 ASSESSMENT — SLEEP AND FATIGUE QUESTIONNAIRES
ESS-CHAD TOTAL SCORE: 3
SITING INACTIVE IN A PUBLIC PLACE LIKE A CLASS ROOM OR A MOVIE THEATER: SLIGHT CHANCE OF DOZING
HOW LIKELY ARE YOU TO NOD OFF OR FALL ASLEEP WHILE SITTING QUIETLY AFTER LUNCH WITHOUT ALCOHOL: WOULD NEVER DOZE
HOW LIKELY ARE YOU TO NOD OFF OR FALL ASLEEP WHILE WATCHING TV: SLIGHT CHANCE OF DOZING
HOW LIKELY ARE YOU TO NOD OFF OR FALL ASLEEP WHILE LYING DOWN TO REST IN THE AFTERNOON WHEN CIRCUMSTANCES PERMIT: SLIGHT CHANCE OF DOZING
HOW LIKELY ARE YOU TO NOD OFF OR FALL ASLEEP WHILE SITTING AND TALKING TO SOMEONE: WOULD NEVER DOZE
HOW LIKELY ARE YOU TO NOD OFF OR FALL ASLEEP WHILE SITTING AND READING: WOULD NEVER DOZE
HOW LIKELY ARE YOU TO NOD OFF OR FALL ASLEEP IN A CAR, WHILE STOPPED FOR A FEW MINUTES IN TRAFFIC: WOULD NEVER DOZE
HOW LIKELY ARE YOU TO NOD OFF OR FALL ASLEEP WHEN YOU ARE A PASSENGER IN A CAR FOR AN HOUR WITHOUT A BREAK: WOULD NEVER DOZE

## 2025-04-16 ASSESSMENT — COLUMBIA-SUICIDE SEVERITY RATING SCALE - C-SSRS
1. IN THE PAST MONTH, HAVE YOU WISHED YOU WERE DEAD OR WISHED YOU COULD GO TO SLEEP AND NOT WAKE UP?: NO
6. HAVE YOU EVER DONE ANYTHING, STARTED TO DO ANYTHING, OR PREPARED TO DO ANYTHING TO END YOUR LIFE?: NO
2. HAVE YOU ACTUALLY HAD ANY THOUGHTS OF KILLING YOURSELF?: NO

## 2025-04-16 ASSESSMENT — PAIN SCALES - GENERAL: PAINLEVEL_OUTOF10: 0-NO PAIN

## 2025-04-16 ASSESSMENT — PATIENT HEALTH QUESTIONNAIRE - PHQ9
SUM OF ALL RESPONSES TO PHQ9 QUESTIONS 1 AND 2: 0
1. LITTLE INTEREST OR PLEASURE IN DOING THINGS: NOT AT ALL
2. FEELING DOWN, DEPRESSED OR HOPELESS: NOT AT ALL

## 2025-04-16 ASSESSMENT — ENCOUNTER SYMPTOMS: DEPRESSION: 0

## 2025-04-16 NOTE — PROGRESS NOTES
[facility] SLEEP MEDICINE   FOLLOW-UP VISIT NOTE    PCP: Nelly Black, GABRIELE-CNP    HISTORY OF PRESENT ILLNESS     Patient ID: Rayo Padgett is a 89 y.o. male who presents for follow-up of DELANEY on PAP, yearly checkup.     Patient is here alone today.  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      Interval History  Patient was last seen in 4/10/2024. Plan was to continue PAP and follow-up yearly.  Since last visit, patient has been using machine every night. Current mask interface being used is [employer]P Brevida nasal pillow mask. Per records, patient is getting supplies thru [employer]  Patient denies machine problems, perceived mask leak, air hunger, aerophagia, nasal congestion, and skin irritation. Complains of dry mouth.  The following are patient's perceived benefits of PAP: decreased snoring / choking / gasping, refreshing sleep, and decreased daytime sleepiness and/or fatigue    RLS Follow-up:   Denies    SLEEP STUDY HISTORY (personally reviewed raw data such as interpretation report, data sheet, hypnogram, and titration table if available and applicable)  Split-night PSG 5/9/2017: Pre-PAP AHI 72.9, supine , no REM sleep during diagnostic portion. CPAP was started at 4 to 7 cm H2O. At CPAP 6 cm H2O with REM nonsupine sleep, AHI was 0, SpO2 jonathon 94%. At CPAP 7 cm H2O, central apneas emerged. pre-PAP TIFFANY 7.1, TIFFANY during titration was 4.1. End-tidal CO2 was normal.   ASV titration 3/26/2018: CPAP 10 cm H2O was started but many central apneas with Cheyne-Keller breathing were noted at this pressure. Central apnea index at CPAP 10 cm H2O 0.1 and at least 50% of the hypopneas were central hypopneas. Overall AHI during titration was 52.2 with mostly central events and the SPO2 jonathon was  89%. ASV was initiated and maintained at EPAP 5 to 15 cm H2O and pressure support of 0 to 20 cm H2O. At this ASV settings, overall AHI was 1.1 while SpO2 jonathon was 91%. Patient did have REM sleep in supine position at this ASV settings. PLM index 14.7.  ASV titration 8/22/2018: ASV was started EPAP 4-15 cm H2Ot, pressure support 0 to 15 cm H2O, and max IPAP 25 cm H2O. Overall AHI 1.3, SpO2 jonathon 90%    SLEEP-WAKE SCHEDULE  Bedtime:  10:15 PM to 11 PM daily  Subjective sleep latency: 5-10 minutes  Difficulty falling asleep: No  Number of awakenings:  2-3 times per night to go to bathroom   Nocturia: Yes  Falls back asleep right away  Final wake time:  7 AM daily  Out of bed time:  7 AM daily  Shift work: No   Naps: once daily for 75 minutes  Feels rested after a nap: Yes (Uses ASV during naps)  Average sleep duration (excluding naps): 8 hours    SLEEP ENVIRONMENT  Sleep location: bed  Sleep status: sleeps alone while spouse sleeps in a different room  Preferred sleep position: back and side    SOCIAL HISTORY  Smoking: no  ETOH: 3 glasses wine 2x per week  Marijuana: no  Caffeine: 2 cups coffee daily in the morning  Sleep aids: no    WEIGHT: stable    Claustrophobia: No     REVIEW OF SYSTEMS     All other systems have been reviewed and are negative.    ALLERGIES     Allergies[1]    MEDICATIONS     Current Medications[2]    Active Problems, Allergy List, Medication List, and PMH/PSH/FH/Social Hx have been reviewed and reconciled in chart. No significant changes unless documented in the pertinent chart section. Updates made when necessary.       PHYSICAL EXAM     VITAL SIGNS: /69   Pulse 85   Resp 16   Wt 118 kg (260 lb)   SpO2 96%   BMI 35.26 kg/m²     Today ESS: 3  Last visit ESS: 4  Last visit ALY: 2    Physical Exam  Constitutional: Awake, not in distress  Lungs: Clear to auscultation bilateral, no rales  Heart: Regular rate and rhythm, no murmurs  Skin: Warm, no rash  Neuro: No tremors, moves all  "extremities  Psych: alert and oriented to time, place, and person    RESULTS/DATA     No results found for: \"IRON\", \"TRANSFERRIN\", \"IRONSAT\", \"TIBC\", \"FERRITIN\"    Bicarbonate   Date Value Ref Range Status   01/07/2025 29 21 - 32 mmol/L Final     PAP Adherence  A PAP adherence data was obtained and reviewed personally today in clinic. (see scanned document in EPIC)  Machine: [employer] Aircurve 10 ASV  Settings:  ASVAuto at EPAP 4-15 cm H2O and PS 3-20 cm H2O  Date Range: 3/16/2025 to 4/14/2025  Days used: 30/30  >4 hrs usage: 100%  Average usage hours (days used): 8 hours 23 minutes  IPAP: Median 9, 95th percentile 15.6, Maximum 23  EPAP: Median 4.9, 95th percentile 7.3, Maximum 8.7  Leak: 95th percentile 2.4, maximum 19.1  AHI: 4     ASSESSMENT/PLAN     Rayo Padgett is a 89 y.o. male who returns in [facility] for the following problems:    OBSTRUCTIVE SLEEP APNEA, SEVERE positional (pre-PAP AHI 72.9 )  TREATMENT EMERGENT CENTRAL SLEEP APNEA   CHEYNE-JUSTIN BREATHING only during titration  - Now on ASVAuto at EPAP 4-15 cm H2O and PS 3-20 cm H2O  - PAP adherence data reviewed today. Usage days 30/30, days> 4 hours at 100%, residual AHI 4.   - Patient reports improvement of DELANEY symptoms.   - Continue current machine settings. Continue using machine every night all night.  - Renew PAP supplies. Order placed and sent to [employer].   - Continue supportive management as follows: Lose weight. Stay off your back when sleeping. Avoid smoking, alcohol, and sedating medications if applicable. Don't drive when sleepy.    OBESITY   - Recommend weight loss with diet and exercise.  - Weight loss can help in long term management of DELANEY.  - Defer management to PCP.    HYPERTENSION  - BP stable today.  - Untreated  or inadequately treated sleep apnea can lead to new onset hypertension, resistant hypertension, or refractory hypertension.  - Continue anti-hypertensive medications per PCP.  - " Supportive management: low salt DASH diet (less than 2000 mg sodium intake daily), moderate intensity aerobic exercise at least 30 minutes 5 days per week, reduce stress, quit smoking, limit alcohol, lose weight, and monitor BP once daily  - PCP following. Defer management to PCP.    ATRIAL FIBRILLATION  - Continue meds per Cardio.  - Cardio following. Defer management to Cardio.    Follow-up in 1 year.    All of patient's questions were answered. He verbalizes understanding and agreement with my assessment and plan. Refer to after-visit-summary (AVS) for patient education and if applicable, for more details on sleep study preparation, troubleshooting issues with PAP usage, proper cleaning instructions of PAP supplies, and usual recommended replacement schedule for each of the PAP supplies.          [1]   Allergies  Allergen Reactions    Codeine Unknown, Other and Syncope    Penicillins Unknown and Hives   [2]   Current Outpatient Medications   Medication Sig Dispense Refill    amLODIPine (Norvasc) 5 mg tablet Take 1 tablet (5 mg) by mouth once daily. 90 tablet 0    aspirin 81 mg capsule Take 1 tablet by mouth once daily.      cholecalciferol (Vitamin D-3) 50 mcg (2,000 unit) capsule Take by mouth.      fluorouracil (Efudex) 5 % cream cream Apply 1 Application topically 2 times a day.      hydroCHLOROthiazide (HYDRODiuril) 25 mg tablet Take 0.5 tablets (12.5 mg) by mouth once daily. 45 tablet 0    Lipitor 10 mg tablet Take 1 tablet (10 mg) by mouth once daily at bedtime.      losartan (Cozaar) 100 mg tablet Take 1 tablet (100 mg) by mouth once daily. 30 tablet 0    Xarelto 20 mg tablet Take 1 tablet (20 mg) by mouth once daily.       No current facility-administered medications for this visit.

## 2025-04-24 NOTE — PATIENT INSTRUCTIONS
"Thank you for coming to the [facility] today! Your sleep medicine provider today was: Yamileth De Dios MD.    Below is a summary of your treatment plan, patient education, important information, our contact numbers, and sleep testing locations.    If you need to schedule, cancel, and reschedule appointment for sleep study, please call [phone]), option 3. For canceling and rescheduling, kindly call 48 hours in advance.  If you need to schedule, cancel, and reschedule follow-up appointment with me, please call [phone]), option 2. You may also call [phone] (direct line for [facility]) or [phone] (direct line for [facility]). Alternatively, you can schedule, cancel, and reschedule clinic appointments in \""1,2,3 Listo"hart\"  If you have a medical question about your sleep issues and/or need medication refills, please call [phone] (direct line for [facility]). Alternatively, you can also send a message directly to your provider through \"My Chart\" ([email or web address])  If you need general assistance (e.g. forms completed, general questions, or additional help scheduling, please call [phone] (direct line for [place] [facility]) or  [phone] (direct line for [facility])    If a referral for a sleep study, CPAP machine, or another specialist was made, and you have not heard about scheduling within a week, please call [phone] (direct line for [facility]) or  [phone] (direct line for [facility])    If you are on CPAP, please bring your device's SD card and/or the device to each clinic appointment.   In case of problems with PAP machine or mask interface, please contact your DME (Durable Medical Equipment) company first for troubleshooting. DME is the company who provides you the machine and/or PAP supplies. Under Important Phone Numbers below, see the list of [employer] companies and their contact " "numbers.     TREATMENT PLAN     - Continue current machine settings. Continue using machine every night all night.   - Renew PAP supplies. Order placed and sent to [employer].  - Please read the \"Patient Education\" section below for more detailed information. Try implementing tips, reminders, strategies, and supportive management.   - If not yet done, please sign up for [email or web address] to make a future schedule, send prescription requests, or send messages.    Follow-up Appointment:   Follow-up in 1 year.    PATIENT EDUCATION     OBSTRUCTIVE SLEEP APNEA (DELANEY) is a sleep disorder where your upper airway muscles relax during sleep and the airway intermittently and repetitively narrows and collapses leading to partially blocked airway (hypopnea) or completely blocked airway (apnea) which, in turn, can disrupt breathing in sleep, lower oxygen levels while you sleep and cause night time wakings. Because both apnea and hypopnea may cause higher carbon dioxide or low oxygen levels, untreated DELANEY can lead to heart arrhythmia, elevation of blood pressure, and make it harder for the body to consolidate memory and facilitate metabolism (leading to higher blood sugars at night). Frequent partial arousals occur during sleep resulting in sleep deprivation and daytime sleepiness. DELANEY is associated with an increased risk of cardiovascular disease, stroke, hypertension, and insulin resistance. Moreover, untreated DELANEY with excessive daytime sleepiness can increase the risk of motor vehicular accidents.    Below are conservative strategies for DELANEY regardless of DELANEY severity are:   Positional therapy - Avoid sleeping on your back.   Healthy diet and regular exercise to optimize weight is highly encouraged.   Avoid alcohol late in the evening and sedative-hypnotics as these substances can make sleep apnea worse.   Improve breathing through the nose with intranasal steroid spray, saline rinse, or antihistamines    Safety: Avoid " driving vehicle and operating heavy equipment while sleepy. Drowsy driving may lead to life-threatening motor vehicle accidents. A person driving while sleepy is 5 times more likely to have an accident. If you feel sleepy, pull over and take a short power nap (sleep for less than 30 minutes). Otherwise, ask somebody to drive you.    Treatment options for sleep apnea include weight management, positional therapy, Positive Airway Therapy (PAP) therapy, oral appliance therapy, hypoglossal nerve stimulator (Inspire) and select airway surgeries.    Annual Reminders About Your Sleep Apnea    Your sleep apnea is well controlled based on reviewing your PAP Data Report.     General Reminders:  Continue current machine settings. Continue using machine every night. Need at least 4 hours daily usage.   Remember to clean your mask, tubings, and water chamber regularly as instructed.   Know the replacement schedule of your supplies/ accessories and contact your DME (durable medical equipment) provider if you are due for them.   Avoid driving or operating heavy machinery when drowsy. A person driving while sleepy is 5 times more likely to have an accident. If you feel sleepy, pull over and take a short power nap (sleep for less than 30 minutes). Otherwise, ask somebody to drive you.    Follow-up sooner through MyChart or calling our office if you have any of the following symptoms:  Snoring or stopping breathing while using the machine  Recurrence of fatigue, sleepiness, insomnia, and other symptoms you had prior using machine  Persistent or worsening fatigue or sleepiness despite regular use of machine  Issues tolerating the machine like bloating sensation, air hunger, too much hot air, too much pressure, taking off mask without recall in the middle of sleep, etc.     Other conservative measures to improve sleep apnea:  Losing weight can lead to some improvement of DELANEY which means you will need lower pressures in machine to  control your DELANEY. In some patients, they don't need to use the machine after bariatric surgery. Hence, consider medical and/or surgical weight loss especially if your BMI is more than 35.  Avoiding alcohol, sedative-hypnotics, and sedating medications is imperative as these substances can worsen snoring and sleep apnea  If you have nasal congestion or seasonal allergies, improving your breathing through the nose is critical for treating sleep apnea, tolerating CPAP, and improving your sleep; hence, using intranasal steroid spray like Flonase might help. Make sure you know the proper way to use it.  Stay off your back when sleeping.    Common issues with PAP machine:  Mask gets dislodged when turning to the side: Consider getting a CPAP pillow or switching to a mask with hose on top.     Dry mouth:  Your machine has built-in humidifier that heats up the air to prevent dry mouth. It can be adjusted to your comfort. You can try that first and increase setting one level one night at a time to check which setting is comfortable and effective in lessening dry mouth. In some patients with heated hose, adjusting tube temperature to make air warmer can improve dry mouth. If dry mouth persists despite adjusting humidity or tube temperature setting, may apply OTC Biotene gel over the gums at bedtime.  If Biotene gel is not effective, consider trying XEROSTOM gel from [employer].  Also, eliminate or reduce dose of medications that can cause dry mouth if possible. Lastly, may try getting a separate room humidifier machine.    Airleaks: Please call DME as they may need to adjust your mask or refit you with a different kind or different size of mask. In addition, you can ask DME for tips on getting a good mask seal and mask fit.     Difficulty tolerating the mask: Contact your DME to try a different kind of mask and/or call office to get a referral to Sleep Psychologist for CPAP desensitization. CPAP desensitization technique is a  "set of strategies that helps patient cope with claustrophobia and anxiety related to wearing mask. Alternatively, we can do a daytime mini-sleep study called PAP-nap trial wherein you will try on different kinds of mask and the sleep technician will try different pressure settings on CPAP and BPAP machines to see which specific pressure is tolerable and comfortable for you.     Water droplets or moisture within the hose and/or mask: This is called rain-out and it is caused by condensation of too much heated humidity on the cooler walls of the hose. If you have rain-out, turn down humidity settings or get a heated hose. If you already have a heated hose, turn up the \"tube temperature\" of the heated hose. Alternatively, if you don't want to get a heated hose or warmer air, may wrap the CPAP hose with stockings to keep it somewhat warm. Also, you need to place the machine on the floor and lower the hose so that water won't travel upward towards your mask.     Maintaining your CPAP/BPAP device:    The humidification chamber (aka water tank or water chamber) needs to be filled with distilled water to prevent buildup of white deposits in the future. If you cannot find distilled water, you can use tap water but expect to have white deposits buildup seen after prolonged use with tap water. If you start seeing white deposits on the water chamber, you can clean it by filling it with equal parts of distilled white vinegar and water. Let the vinegar-water mixture sit for 2 hours, and then rinse it with running tap water. Clean with soap and water then let it dry.     You should try to keep your machine clean in order to work well. Here are some tips to clean PAP supplies / accessories:    Clean the humidification chamber (aka water tank) as well as your mask and tubing at least once a week with soap and water.   Alternatively, you can fill a sink or basin with warm water and add a little mild detergent, like Ivory dish soap. " Gently wipe your supplies with the soapy water to free all the oils and dirt that may have collected. Once that's done, rinse these items with clean water until the soap is gone and let them air dry. You can hang your tubing over the curtain mary in your bathroom so that it dries.  The mask insert (part of the mask that has contact with your skin) needs to be cleaned with soap and water daily. Another option is to wipe them down with CPAP wipes or baby wipes.    You should replace your mask and tubing frequently in order to prevent bacteria buildup, machine damage, and mask seal issues. The older the mask and hose, the high likelihood that there is bacteria buildup in it especially if they are not cleaned regularly. Dirty filters damage machines because build-up of dust and contaminants can cause machine to over-heat, and in time, damage the motor of machine. Cushions lose their seal over time as most masks are made of plastic and silicone while headgear is made of neoprene. These materials will break down with age and frequent use. Here is the recommended replacement schedule for PAP supplies / accessories:    Twice a month- disposable filters and cushions for nasal mask or nasal pillows.  Once a month- cushion for full face mask  Every 3 months- mask with headgear and PAP tubing (standard or heated hose)  Every 6 months- reusable filter, water chamber, and chin strap     Other useful information:    Some people do not put water in the tank while other people prefers to put water in the tank to prevent mouth dryness. Try to experiment to determine which is more comfortable for you.   In general, new machines have 2 years warranty on parts while health insurance allows you to have a new machine once every 5 years.     You can also go to the following EDUCATION WEBSITES for further information:   American Academy of Sleep Medicine http://sleepeducation.org  National Sleep Foundation:  https://sleepfoundation.org  American Sleep Apnea Association: https://www.sleepapnea.org (for patients with sleep apnea)  Narcolepsy Network: https://www.narcolepsynetwork.org (for patients with narcolepsy)  [employer]: https://www.wakeupnarcolepsy.org (for patients with narcolepsy)  Hypersomnia Foundation: https://www.hypersomniafoundation.org (for patients with idiopathic hypersomnia)  RLS foundation: https://www.rls.org (for patients with restless leg syndrome)    IMPORTANT INFORMATION     Call 911 for medical emergencies.  Our offices are generally open from Monday-Friday, 8 am - 5 pm.   There are no supporting services by either the sleep doctors or their staff on weekends and Holidays, or after 5 PM on weekdays.   If you need to get in touch with me, you may call the direct line for Adult Sleep Nurses or the direct line of any of my 2 offices ([place] or [facility]). Alternatively, you can also use [employer].    PRESCRIPTIONS     We require 7 days advanced notice for prescription refills. If we do not receive the request in this time, we cannot guarantee that your medication will be refilled in time.    IMPORTANT PHONE NUMBERS     Sleep Medicine Clinic Fax: [phone]  Appointments (for Pediatric Sleep Clinic): [phone] - option 1  Appointments (for Adult Sleep Clinic): [phone] - option 2  Appointments (For Sleep Studies): [phone] - option 3  Behavioral Sleep Medicine: [phone]  Sleep Surgery: [phone] (Dr. Calle or Dr. Jurado)  Myofunctional Therapy (ENT): [phone] for [facility] [place]; [phone] for [facility]; [phone] for [facility]; [phone] for [place]; [phone] option 1 for [place]  Nutrition Service: [phone]  Weight management clinic with endocrinology: [phone] or [phone] (Dr. Holland)  Bariatric Surgery: [phone]   [facility]: [phone] (offers holistic approaches to weight  management)  ENT (Otolaryngology): [phone]  Neurology: [phone]  Psychiatry: [phone]  Pulmonary Function Testing (PFT) Center: [phone]  Pulmonary Medicine: [phone]  [employer] (DME): [phone]  [facility] [employer] (DME): [phone] or [phone] (formerly known as [facility])  [facility] (DME): [phone]  [facility] (DME): [phone]  [facility] [employer] (DME): [phone] ([place]), [phone] ([place])    OUR SLEEP TESTING LOCATIONS     Our team will contact you to schedule your sleep study, however, you can contact us as follows:  Main Phone Line (scheduling only): [phone] ([phone]), option 3  Adult and Pediatric Locations  [facility] (6 years and older): [facility] - 4th floor ([address]) After hours line: [phone]  [facility] (Main campus: All ages): [facility], 6th floor. After hours line: [phone]   [place] (5 years and older; younger considered on case-by-case basis): [address], [address]. Scheduling  After hours line: [phone]   [place] (6 years and older): [address]; [address]; [address]   [place] (6 years and older): [address], [address]  After hours line: [phone]   [place] (13 years and older) in [place]: [address], 2nd floor  After hours line: [phone]   [place] (13 years and older): [address]  After hours line: [phone]     Adult Only Locations:   [place] (18 years and older): [address], 2nd floor   [place] (18 years and older): [address]; 4th floor  After hours line: [phone]   [place] (18 years and older) at [place]: [address]  After hours line: [phone]     Here at [facility], we wish you a restful  sleep!

## 2025-04-26 DIAGNOSIS — R79.89 OTHER SPECIFIED ABNORMAL FINDINGS OF BLOOD CHEMISTRY: ICD-10-CM

## 2025-04-28 RX ORDER — LOSARTAN POTASSIUM 100 MG/1
100 TABLET ORAL DAILY
Qty: 30 TABLET | Refills: 0 | Status: SHIPPED | OUTPATIENT
Start: 2025-04-28

## 2025-05-14 DIAGNOSIS — R17 ELEVATED BILIRUBIN: Primary | ICD-10-CM

## 2025-05-14 LAB
ALBUMIN SERPL-MCNC: 4.3 G/DL (ref 3.6–5.1)
ALP SERPL-CCNC: 68 U/L (ref 35–144)
ALT SERPL-CCNC: 17 U/L (ref 9–46)
ANION GAP SERPL CALCULATED.4IONS-SCNC: 9 MMOL/L (CALC) (ref 7–17)
AST SERPL-CCNC: 18 U/L (ref 10–35)
BILIRUB SERPL-MCNC: 1.6 MG/DL (ref 0.2–1.2)
BUN SERPL-MCNC: 18 MG/DL (ref 7–25)
CALCIUM SERPL-MCNC: 8.9 MG/DL (ref 8.6–10.3)
CHLORIDE SERPL-SCNC: 102 MMOL/L (ref 98–110)
CO2 SERPL-SCNC: 28 MMOL/L (ref 20–32)
CREAT SERPL-MCNC: 1.15 MG/DL (ref 0.7–1.22)
EGFRCR SERPLBLD CKD-EPI 2021: 62 ML/MIN/1.73M2
GLUCOSE SERPL-MCNC: 101 MG/DL (ref 65–99)
POTASSIUM SERPL-SCNC: 4.1 MMOL/L (ref 3.5–5.3)
PROT SERPL-MCNC: 6.9 G/DL (ref 6.1–8.1)
SODIUM SERPL-SCNC: 139 MMOL/L (ref 135–146)

## 2025-05-15 ENCOUNTER — TELEPHONE (OUTPATIENT)
Dept: PRIMARY CARE | Facility: CLINIC | Age: 89
End: 2025-05-15
Payer: MEDICARE

## 2025-05-15 NOTE — TELEPHONE ENCOUNTER
Result Communication    Resulted Orders   Comprehensive Metabolic Panel   Result Value Ref Range    GLUCOSE 101 (H) 65 - 99 mg/dL      Comment:                    Fasting reference interval     For someone without known diabetes, a glucose value  between 100 and 125 mg/dL is consistent with  prediabetes and should be confirmed with a  follow-up test.         UREA NITROGEN (BUN) 18 7 - 25 mg/dL    CREATININE 1.15 0.70 - 1.22 mg/dL    EGFR 62 > OR = 60 mL/min/1.73m2    SODIUM 139 135 - 146 mmol/L    POTASSIUM 4.1 3.5 - 5.3 mmol/L    CHLORIDE 102 98 - 110 mmol/L    CARBON DIOXIDE 28 20 - 32 mmol/L    ELECTROLYTE BALANCE 9 7 - 17 mmol/L (calc)    CALCIUM 8.9 8.6 - 10.3 mg/dL    PROTEIN, TOTAL 6.9 6.1 - 8.1 g/dL    ALBUMIN 4.3 3.6 - 5.1 g/dL    BILIRUBIN, TOTAL 1.6 (H) 0.2 - 1.2 mg/dL    ALKALINE PHOSPHATASE 68 35 - 144 U/L    AST 18 10 - 35 U/L    ALT 17 9 - 46 U/L    Narrative    FASTING:YES    FASTING: YES       4:16 PM      Results were successfully communicated with the patient and they acknowledged their understanding.

## 2025-05-15 NOTE — TELEPHONE ENCOUNTER
----- Message from Nelly Black sent at 5/15/2025 12:56 PM EDT -----  Can you please update patient that his bilirubin levels remain elevated.  Because of this, I would like him to undergo an ultrasound of the liver at his convenience at [facility].  Please have him   notify us for any additional questions or concerns  ----- Message -----  From: [employer] Results In  Sent: 5/14/2025   9:28 PM EDT  To: GABRIELE Aquino-CNP

## 2025-05-21 ENCOUNTER — HOSPITAL ENCOUNTER (OUTPATIENT)
Dept: RADIOLOGY | Facility: HOSPITAL | Age: 89
Discharge: HOME | End: 2025-05-21
Payer: MEDICARE

## 2025-05-21 DIAGNOSIS — I10 ESSENTIAL (PRIMARY) HYPERTENSION: ICD-10-CM

## 2025-05-21 DIAGNOSIS — R17 ELEVATED BILIRUBIN: ICD-10-CM

## 2025-05-21 PROBLEM — I77.810 DILATED AORTIC ROOT: Status: ACTIVE | Noted: 2025-01-07

## 2025-05-21 PROCEDURE — 76705 ECHO EXAM OF ABDOMEN: CPT | Performed by: RADIOLOGY

## 2025-05-21 PROCEDURE — 76705 ECHO EXAM OF ABDOMEN: CPT

## 2025-05-21 RX ORDER — AMLODIPINE BESYLATE 5 MG/1
5 TABLET ORAL DAILY
Qty: 90 TABLET | Refills: 0 | Status: SHIPPED | OUTPATIENT
Start: 2025-05-21

## 2025-05-21 RX ORDER — HYDROCHLOROTHIAZIDE 25 MG/1
12.5 TABLET ORAL DAILY
Qty: 45 TABLET | Refills: 0 | Status: SHIPPED | OUTPATIENT
Start: 2025-05-21

## 2025-05-22 ENCOUNTER — APPOINTMENT (OUTPATIENT)
Dept: PRIMARY CARE | Facility: CLINIC | Age: 89
End: 2025-05-22
Payer: MEDICARE

## 2025-05-22 VITALS
HEIGHT: 72 IN | DIASTOLIC BLOOD PRESSURE: 70 MMHG | BODY MASS INDEX: 34.75 KG/M2 | SYSTOLIC BLOOD PRESSURE: 126 MMHG | WEIGHT: 256.6 LBS | OXYGEN SATURATION: 97 % | HEART RATE: 50 BPM

## 2025-05-22 DIAGNOSIS — Z13.220 ENCOUNTER FOR LIPID SCREENING FOR CARDIOVASCULAR DISEASE: ICD-10-CM

## 2025-05-22 DIAGNOSIS — Z00.00 ROUTINE ADULT HEALTH MAINTENANCE: Primary | ICD-10-CM

## 2025-05-22 DIAGNOSIS — I10 PRIMARY HYPERTENSION: ICD-10-CM

## 2025-05-22 DIAGNOSIS — I48.91 ATRIAL FIBRILLATION, UNSPECIFIED TYPE (MULTI): ICD-10-CM

## 2025-05-22 DIAGNOSIS — H61.23 BILATERAL IMPACTED CERUMEN: ICD-10-CM

## 2025-05-22 DIAGNOSIS — E55.9 VITAMIN D DEFICIENCY: ICD-10-CM

## 2025-05-22 DIAGNOSIS — I71.20 THORACIC AORTIC ANEURYSM WITHOUT RUPTURE, UNSPECIFIED PART: ICD-10-CM

## 2025-05-22 DIAGNOSIS — Z86.73 HISTORY OF MULTIPLE STROKES: ICD-10-CM

## 2025-05-22 DIAGNOSIS — Z13.6 ENCOUNTER FOR LIPID SCREENING FOR CARDIOVASCULAR DISEASE: ICD-10-CM

## 2025-05-22 DIAGNOSIS — Z13.21 ENCOUNTER FOR VITAMIN DEFICIENCY SCREENING: ICD-10-CM

## 2025-05-22 DIAGNOSIS — Z13.29 SCREENING FOR THYROID DISORDER: ICD-10-CM

## 2025-05-22 DIAGNOSIS — R73.09 BLOOD GLUCOSE ABNORMAL: ICD-10-CM

## 2025-05-22 DIAGNOSIS — Z13.220 SCREENING FOR CHOLESTEROL LEVEL: ICD-10-CM

## 2025-05-22 DIAGNOSIS — K76.0 FATTY LIVER: ICD-10-CM

## 2025-05-22 DIAGNOSIS — Z00.00 ROUTINE GENERAL MEDICAL EXAMINATION AT HEALTH CARE FACILITY: ICD-10-CM

## 2025-05-22 DIAGNOSIS — Z13.1 SCREENING FOR DIABETES MELLITUS: ICD-10-CM

## 2025-05-22 DIAGNOSIS — Z79.899 ON LONG TERM DRUG THERAPY: ICD-10-CM

## 2025-05-22 PROCEDURE — 99214 OFFICE O/P EST MOD 30 MIN: CPT | Performed by: NURSE PRACTITIONER

## 2025-05-22 PROCEDURE — 3078F DIAST BP <80 MM HG: CPT | Performed by: NURSE PRACTITIONER

## 2025-05-22 PROCEDURE — 69209 REMOVE IMPACTED EAR WAX UNI: CPT | Performed by: NURSE PRACTITIONER

## 2025-05-22 PROCEDURE — 1159F MED LIST DOCD IN RCRD: CPT | Performed by: NURSE PRACTITIONER

## 2025-05-22 PROCEDURE — 1036F TOBACCO NON-USER: CPT | Performed by: NURSE PRACTITIONER

## 2025-05-22 PROCEDURE — 1170F FXNL STATUS ASSESSED: CPT | Performed by: NURSE PRACTITIONER

## 2025-05-22 PROCEDURE — G0439 PPPS, SUBSEQ VISIT: HCPCS | Performed by: NURSE PRACTITIONER

## 2025-05-22 PROCEDURE — 3074F SYST BP LT 130 MM HG: CPT | Performed by: NURSE PRACTITIONER

## 2025-05-22 PROCEDURE — 1160F RVW MEDS BY RX/DR IN RCRD: CPT | Performed by: NURSE PRACTITIONER

## 2025-05-22 ASSESSMENT — COLUMBIA-SUICIDE SEVERITY RATING SCALE - C-SSRS
2. HAVE YOU ACTUALLY HAD ANY THOUGHTS OF KILLING YOURSELF?: NO
1. IN THE PAST MONTH, HAVE YOU WISHED YOU WERE DEAD OR WISHED YOU COULD GO TO SLEEP AND NOT WAKE UP?: NO
6. HAVE YOU EVER DONE ANYTHING, STARTED TO DO ANYTHING, OR PREPARED TO DO ANYTHING TO END YOUR LIFE?: NO

## 2025-05-22 ASSESSMENT — PATIENT HEALTH QUESTIONNAIRE - PHQ9
2. FEELING DOWN, DEPRESSED OR HOPELESS: SEVERAL DAYS
SUM OF ALL RESPONSES TO PHQ9 QUESTIONS 1 AND 2: 1
10. IF YOU CHECKED OFF ANY PROBLEMS, HOW DIFFICULT HAVE THESE PROBLEMS MADE IT FOR YOU TO DO YOUR WORK, TAKE CARE OF THINGS AT HOME, OR GET ALONG WITH OTHER PEOPLE: SOMEWHAT DIFFICULT
1. LITTLE INTEREST OR PLEASURE IN DOING THINGS: NOT AT ALL

## 2025-05-22 ASSESSMENT — ENCOUNTER SYMPTOMS
LOSS OF SENSATION IN FEET: 0
DIFFICULTY URINATING: 0
ABDOMINAL DISTENTION: 0
WEAKNESS: 0
MYALGIAS: 0
CONSTIPATION: 0
FEVER: 0
BACK PAIN: 0
WOUND: 0
NAUSEA: 0
ADENOPATHY: 0
TROUBLE SWALLOWING: 0
EYE PAIN: 0
COLOR CHANGE: 0
HEADACHES: 0
DIZZINESS: 0
WHEEZING: 0
SHORTNESS OF BREATH: 0
ABDOMINAL PAIN: 0
COUGH: 0
PALPITATIONS: 0
OCCASIONAL FEELINGS OF UNSTEADINESS: 0
DEPRESSION: 0
DIARRHEA: 0
CHILLS: 0
JOINT SWELLING: 0
FATIGUE: 0
BRUISES/BLEEDS EASILY: 0
SEIZURES: 0

## 2025-05-22 ASSESSMENT — ACTIVITIES OF DAILY LIVING (ADL)
DRESSING: INDEPENDENT
MANAGING_FINANCES: INDEPENDENT
DOING_HOUSEWORK: INDEPENDENT
GROCERY_SHOPPING: INDEPENDENT
TAKING_MEDICATION: INDEPENDENT
BATHING: INDEPENDENT

## 2025-05-22 NOTE — PROGRESS NOTES
Patient ID: Rayo Padgett is a 89 y.o. male.    Ear Cerumen Removal    Date/Time: 5/22/2025 3:09 PM    Performed by: Eva Rodriguez MA  Authorized by: SHMUEL Aquino    Consent:     Consent obtained:  Verbal    Consent given by:  Patient    Risks, benefits, and alternatives were discussed: yes      Risks discussed:  Bleeding, infection, dizziness, incomplete removal, pain and TM perforation    Alternatives discussed:  Alternative treatment  Universal protocol:     Procedure explained and questions answered to patient or proxy's satisfaction: yes      Patient identity confirmed:  Verbally with patient  Procedure details:     Location:  R ear and L ear    Procedure type: irrigation      Procedure outcomes: cerumen removed    Post-procedure details:     Inspection:  Ear canal clear    Hearing quality:  Improved    Procedure completion:  Tolerated

## 2025-05-22 NOTE — PATIENT INSTRUCTIONS
Orders are in place for you to complete imaging for additional assessment purposes (CT of the chest).  You can have this completed at [facility].  Please contact the radiology department there to schedule.  The number is [phone]      Orders are in place for you to have fasting blood work completed. Please do not eat or drink 8 hours prior to having your blood drawn.   You may have your blood work completed in this building through [employer] ([address], [address], [place] [address]).  For this location, you may schedule an appointment online or drop-in for walk-in services. [email or web address]  Hours:   Monday - Friday: 7:30 a.m. - 5 p.m. and Saturday: 8 a.m. - 11 a.m.  Phone:  [phone]      Please arrange for routine follow up in 6 months. You may schedule on-line through [employer] or call our office at [phone].

## 2025-05-22 NOTE — PROGRESS NOTES
"Subjective   Patient ID: Rayo Padgett is a 89 y.o. male who presents for Follow-up (6 month ) and Medicare Annual Wellness Visit Subsequent.    Patient seen today in order to establish primary care.  Present for today's assessment is patient's spouse.  Patient's spouse has dementia and he is her primary caregiver.  Private duty caregivers come to the residence twice weekly to assist with her care and then she goes to \"adult day care\" 2 other days during the week..  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  Patient does not exercise routinely but denies any issues with shortness of breath or chest pain upon exertion he has DELANEY and wears a CPAP routinely at night.  He does not smoke but reports approximately 2 glasses of alcohol in the evenings.  No reported issues with appetite, staying hydrated, bowel or bladder.  Patient follows routinely with cardiology, Dr. Chowdhury, for A-fib and thoracic aneurysm.  Patient does not monitor his vital signs or weights routinely but denies any issues with chest pain/pressure, headaches, blurred vision or other cardiac concerns.  History of 2 previous strokes without any significant deficits reported.  Patient denies any recent neurology follow-up.  Although patient denies any discrete issues with anxiety or depression he does report frustration and stress secondary to his spouse.  Medications reviewed.  No other acute concerns voiced at this time.     Current Outpatient Medications on File Prior to Visit   Medication Sig Dispense Refill    amLODIPine (Norvasc) 5 mg tablet TAKE 1 TABLET BY MOUTH ONCE  DAILY 90 tablet 0    aspirin 81 mg capsule Take 1 tablet by mouth once daily.      cholecalciferol (Vitamin D-3) 50 mcg (2,000 unit) capsule Take by mouth.      fluorouracil (Efudex) 5 % " cream cream Apply 1 Application topically 2 times a day.      hydroCHLOROthiazide (HYDRODiuril) 25 mg tablet TAKE ONE-HALF TABLET BY MOUTH  ONCE DAILY 45 tablet 0    Lipitor 10 mg tablet Take 1 tablet (10 mg) by mouth once daily at bedtime.      losartan (Cozaar) 100 mg tablet TAKE 1 TABLET BY MOUTH ONCE  DAILY 30 tablet 0    Xarelto 20 mg tablet Take 1 tablet (20 mg) by mouth once daily.      [DISCONTINUED] amLODIPine (Norvasc) 5 mg tablet Take 1 tablet (5 mg) by mouth once daily. 90 tablet 0    [DISCONTINUED] hydroCHLOROthiazide (HYDRODiuril) 25 mg tablet Take 0.5 tablets (12.5 mg) by mouth once daily. 45 tablet 0     No current facility-administered medications on file prior to visit.       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    Headache, unspecified 02/22/2017    Chronic daily headache        Past Surgical History:   Procedure Laterality Date    MR HEAD ANGIO WO IV CONTRAST  6/5/2015    MR HEAD ANGIO WO IV CONTRAST 6/5/2015 [facility]ICAL [facility]    MR HEAD ANGIO WO IV CONTRAST  2/9/2017    MR HEAD ANGIO WO IV CONTRAST 2/9/2017 [facility]    MR NECK ANGIO WO IV CONTRAST  6/5/2015    MR NECK ANGIO WO IV CONTRAST 6/5/2015 [facility] CLINICAL [facility]GAC[facility]    OTHER SURGICAL HISTORY  11/14/2019    Skin biopsy    OTHER SURGICAL HISTORY  11/14/2019    Hernia repair        No family history on file.     Review of Systems   Constitutional:  Negative for chills, fatigue and fever.   HENT:  Negative for dental problem and trouble swallowing.    Eyes:  Negative for pain and visual disturbance.        Wears glasses occasionally   Respiratory:  Negative for cough, shortness of breath and wheezing.    Cardiovascular:  Negative for chest pain, palpitations and leg swelling.   Gastrointestinal:  Negative for abdominal distention, abdominal pain, constipation, diarrhea and nausea.   Endocrine: Negative for cold intolerance and heat intolerance.   Genitourinary:   Negative for difficulty urinating.   Musculoskeletal:  Negative for back pain, gait problem, joint swelling and myalgias.   Skin:  Negative for color change, pallor, rash and wound.   Allergic/Immunologic: Negative for environmental allergies and food allergies.   Neurological:  Negative for dizziness, seizures, weakness and headaches.        History of past strokes   Hematological:  Negative for adenopathy. Does not bruise/bleed easily.   Psychiatric/Behavioral:  Negative for behavioral problems.    All other systems reviewed and are negative.      Objective   /70   Pulse 50   Ht 1.829 m (6')   Wt 116 kg (256 lb 9.6 oz)   SpO2 97%   BMI 34.80 kg/m²     Physical Exam  Constitutional:       General: He is not in acute distress.     Appearance: Normal appearance. He is not toxic-appearing.   HENT:      Head: Normocephalic and atraumatic.      Right Ear: Ear canal and external ear normal. There is impacted cerumen.      Left Ear: Ear canal and external ear normal. There is impacted cerumen.      Nose: Nose normal.      Mouth/Throat:      Mouth: Mucous membranes are moist.      Pharynx: Oropharynx is clear.   Eyes:      Extraocular Movements: Extraocular movements intact.      Conjunctiva/sclera: Conjunctivae normal.      Pupils: Pupils are equal, round, and reactive to light.   Cardiovascular:      Rate and Rhythm: Normal rate. Rhythm irregular.      Pulses: Normal pulses.      Heart sounds: Normal heart sounds. No murmur heard.  Pulmonary:      Effort: Pulmonary effort is normal.      Breath sounds: Normal breath sounds. No wheezing.   Abdominal:      General: Bowel sounds are normal.      Palpations: Abdomen is soft.   Musculoskeletal:         General: No swelling. Normal range of motion.      Cervical back: Normal range of motion and neck supple.   Skin:     General: Skin is warm and dry.   Neurological:      General: No focal deficit present.      Mental Status: He is alert and oriented to person, place,  and time. Mental status is at baseline.      Cranial Nerves: No cranial nerve deficit.      Motor: No weakness.   Psychiatric:         Mood and Affect: Mood normal.         Behavior: Behavior normal.         Thought Content: Thought content normal.         Judgment: Judgment normal.         Assessment/Plan   Problem List Items Addressed This Visit           ICD-10-CM    Thoracic aortic aneurysm - Primary I71.20    Relevant Orders    CT chest wo IV contrast             daily baby aspirin.  Cardiology follow-up for blood pressure control         Fatty liver K76.0    Ultrasound of the liver recently completed due to elevated bilirubin levels.  Findings show fatty liver disease.  Will continue to monitor patient's level function closely.  Consider GI follow-up?  Discussed increased physical activity and following a low fat, low alcohol diet          Other Visit Diagnoses         Codes      Screening for diabetes mellitus     Z13.1    Relevant Orders    Hemoglobin A1C      Blood glucose abnormal     R73.09    Relevant Orders    Hemoglobin A1C      Screening for thyroid disorder     Z13.29    Relevant Orders    TSH with reflex to Free T4 if abnormal      Screening for cholesterol level     Z13.220    Relevant Orders    Lipid Panel      Encounter for lipid screening for cardiovascular disease     Z13.220, Z13.6    Relevant Orders    Lipid Panel      Encounter for vitamin deficiency screening     Z13.21    Relevant Orders    Vitamin D 25-Hydroxy,Total (for eval of Vitamin D levels)      On long term drug therapy     Z79.899    Relevant Orders    Vitamin D 25-Hydroxy,Total (for eval of Vitamin D levels)      Vitamin D deficiency     E55.9    Relevant Orders    Vitamin D 25-Hydroxy,Total (for eval of Vitamin D levels)      Bilateral impacted cerumen     H61.23    Relevant Orders    Ear Cerumen Removal      Routine general medical examination at health care facility     Z00.00    Relevant Orders    1 Year Follow Up In Primary Care - Wellness Exam

## 2025-05-27 PROBLEM — R74.8 ELEVATED SERUM GAMMA-GLUTAMYL TRANSFERASE LEVEL: Status: RESOLVED | Noted: 2023-05-05 | Resolved: 2025-05-27

## 2025-05-27 PROBLEM — R26.0 ATAXIC GAIT: Status: RESOLVED | Noted: 2023-05-05 | Resolved: 2025-05-27

## 2025-05-27 PROBLEM — E78.5 HYPERLIPIDEMIA: Status: RESOLVED | Noted: 2023-05-05 | Resolved: 2025-05-27

## 2025-05-27 PROBLEM — R06.3 CHEYNE-STOKES RESPIRATION: Status: RESOLVED | Noted: 2023-05-05 | Resolved: 2025-05-27

## 2025-05-27 PROBLEM — K76.0 FATTY LIVER: Status: ACTIVE | Noted: 2025-05-27

## 2025-05-27 NOTE — ASSESSMENT & PLAN NOTE
Rate controlled, on Xarelto, followed by Dr. Chowdhury for cardiology.  Patient to notify provider for any persistent issues with excessive bruising or bleeding.

## 2025-05-27 NOTE — ASSESSMENT & PLAN NOTE
Ultrasound of the liver recently completed due to elevated bilirubin levels.  Findings show fatty liver disease.  Will continue to monitor patient's level function closely.  Consider GI follow-up?  Discussed increased physical activity and following a low fat, low alcohol diet

## 2025-05-27 NOTE — ASSESSMENT & PLAN NOTE
"Unchanged since the discovery in 2018.  Recent imaging completed in July, 2023 and showed \"There is stable mild aneurysmal dilatation of the thoracic aorta. Continued surveillance with 2 year contrasted chest CT can be performed\".  Orders placed today for CT of the chest to be completed for surveillance purposes  "

## 2025-05-27 NOTE — ASSESSMENT & PLAN NOTE
No recent neurology follow-up completed.  Patient continues on daily baby aspirin.  Cardiology follow-up for blood pressure control

## 2025-06-23 ENCOUNTER — TELEPHONE (OUTPATIENT)
Dept: PULMONOLOGY | Facility: CLINIC | Age: 89
End: 2025-06-23
Payer: MEDICARE

## 2025-06-23 DIAGNOSIS — G47.33 OSA (OBSTRUCTIVE SLEEP APNEA): ICD-10-CM

## 2025-06-23 DIAGNOSIS — G47.39 TREATMENT-EMERGENT CENTRAL SLEEP APNEA: Primary | ICD-10-CM

## 2025-06-23 NOTE — TELEPHONE ENCOUNTER
Patient called and said he was seen by Dr. De Dios in April, his machine is now telling him the engine life has  and he was hoping for an order to be placed for a new machine to [facility].    Call back at [phone]

## 2025-07-14 DIAGNOSIS — I10 ESSENTIAL (PRIMARY) HYPERTENSION: ICD-10-CM

## 2025-07-14 RX ORDER — AMLODIPINE BESYLATE 5 MG/1
5 TABLET ORAL DAILY
Qty: 90 TABLET | Refills: 1 | Status: SHIPPED | OUTPATIENT
Start: 2025-07-14

## 2025-07-24 DIAGNOSIS — R79.89 OTHER SPECIFIED ABNORMAL FINDINGS OF BLOOD CHEMISTRY: ICD-10-CM

## 2025-07-24 RX ORDER — LOSARTAN POTASSIUM 100 MG/1
100 TABLET ORAL DAILY
Qty: 30 TABLET | Refills: 2 | Status: SHIPPED | OUTPATIENT
Start: 2025-07-24

## 2025-08-18 ENCOUNTER — HOSPITAL ENCOUNTER (OUTPATIENT)
Dept: RADIOLOGY | Facility: HOSPITAL | Age: 89
Discharge: HOME | End: 2025-08-18
Payer: MEDICARE

## 2025-08-18 DIAGNOSIS — I71.20 THORACIC AORTIC ANEURYSM WITHOUT RUPTURE, UNSPECIFIED PART: ICD-10-CM

## 2025-08-18 PROCEDURE — 71250 CT THORAX DX C-: CPT

## 2025-08-18 PROCEDURE — 71250 CT THORAX DX C-: CPT | Performed by: RADIOLOGY

## 2025-08-19 ENCOUNTER — RESULTS FOLLOW-UP (OUTPATIENT)
Dept: PRIMARY CARE | Facility: CLINIC | Age: 89
End: 2025-08-19
Payer: MEDICARE

## 2025-08-19 DIAGNOSIS — I71.20 THORACIC AORTIC ANEURYSM WITHOUT RUPTURE, UNSPECIFIED PART: Primary | ICD-10-CM

## 2025-08-22 DIAGNOSIS — I48.91 NEW ONSET A-FIB (MULTI): Primary | ICD-10-CM

## 2025-08-22 DIAGNOSIS — I71.21 ANEURYSM OF THE ASCENDING AORTA, WITHOUT RUPTURE: ICD-10-CM

## 2025-08-28 ENCOUNTER — TELEPHONE (OUTPATIENT)
Dept: CARDIAC SURGERY | Facility: HOSPITAL | Age: 89
End: 2025-08-28
Payer: MEDICARE

## 2025-08-28 DIAGNOSIS — I10 ESSENTIAL (PRIMARY) HYPERTENSION: ICD-10-CM

## 2025-08-29 DIAGNOSIS — I10 ESSENTIAL (PRIMARY) HYPERTENSION: ICD-10-CM

## 2025-08-29 RX ORDER — HYDROCHLOROTHIAZIDE 25 MG/1
12.5 TABLET ORAL DAILY
Qty: 45 TABLET | Refills: 1 | Status: SHIPPED | OUTPATIENT
Start: 2025-08-29 | End: 2025-08-29 | Stop reason: SDUPTHER

## 2025-08-29 RX ORDER — HYDROCHLOROTHIAZIDE 25 MG/1
12.5 TABLET ORAL DAILY
Qty: 45 TABLET | Refills: 1 | Status: SHIPPED | OUTPATIENT
Start: 2025-08-29

## 2025-11-24 ENCOUNTER — APPOINTMENT (OUTPATIENT)
Dept: PRIMARY CARE | Facility: CLINIC | Age: 89
End: 2025-11-24
Payer: MEDICARE